# Patient Record
Sex: FEMALE | Race: WHITE | NOT HISPANIC OR LATINO
[De-identification: names, ages, dates, MRNs, and addresses within clinical notes are randomized per-mention and may not be internally consistent; named-entity substitution may affect disease eponyms.]

---

## 2017-05-21 ENCOUNTER — FORM ENCOUNTER (OUTPATIENT)
Age: 77
End: 2017-05-21

## 2017-05-22 ENCOUNTER — EMERGENCY (EMERGENCY)
Facility: HOSPITAL | Age: 77
LOS: 1 days | Discharge: HOME | End: 2017-05-22
Attending: STUDENT IN AN ORGANIZED HEALTH CARE EDUCATION/TRAINING PROGRAM
Payer: MEDICARE

## 2017-05-22 ENCOUNTER — APPOINTMENT (OUTPATIENT)
Dept: ULTRASOUND IMAGING | Facility: CLINIC | Age: 77
End: 2017-05-22

## 2017-05-22 ENCOUNTER — OUTPATIENT (OUTPATIENT)
Dept: OUTPATIENT SERVICES | Facility: HOSPITAL | Age: 77
LOS: 1 days | End: 2017-05-22
Payer: MEDICARE

## 2017-05-22 ENCOUNTER — APPOINTMENT (OUTPATIENT)
Dept: PULMONOLOGY | Facility: CLINIC | Age: 77
End: 2017-05-22

## 2017-05-22 VITALS
WEIGHT: 94 LBS | HEART RATE: 97 BPM | DIASTOLIC BLOOD PRESSURE: 60 MMHG | OXYGEN SATURATION: 96 % | BODY MASS INDEX: 17.75 KG/M2 | HEIGHT: 61 IN | SYSTOLIC BLOOD PRESSURE: 100 MMHG

## 2017-05-22 VITALS
HEART RATE: 67 BPM | TEMPERATURE: 98 F | WEIGHT: 93.92 LBS | SYSTOLIC BLOOD PRESSURE: 149 MMHG | RESPIRATION RATE: 100 BRPM | DIASTOLIC BLOOD PRESSURE: 80 MMHG

## 2017-05-22 DIAGNOSIS — Z79.82 LONG TERM (CURRENT) USE OF ASPIRIN: ICD-10-CM

## 2017-05-22 DIAGNOSIS — Z98.891 HISTORY OF UTERINE SCAR FROM PREVIOUS SURGERY: Chronic | ICD-10-CM

## 2017-05-22 DIAGNOSIS — H26.9 UNSPECIFIED CATARACT: Chronic | ICD-10-CM

## 2017-05-22 DIAGNOSIS — J99 MYONEURAL DISORDER, UNSPECIFIED: ICD-10-CM

## 2017-05-22 DIAGNOSIS — G12.21 AMYOTROPHIC LATERAL SCLEROSIS: ICD-10-CM

## 2017-05-22 DIAGNOSIS — I82.402 ACUTE EMBOLISM AND THROMBOSIS OF UNSPECIFIED DEEP VEINS OF LEFT LOWER EXTREMITY: ICD-10-CM

## 2017-05-22 DIAGNOSIS — D49.3 NEOPLASM OF UNSPECIFIED BEHAVIOR OF BREAST: Chronic | ICD-10-CM

## 2017-05-22 DIAGNOSIS — R94.2 ABNORMAL RESULTS OF PULMONARY FUNCTION STUDIES: ICD-10-CM

## 2017-05-22 DIAGNOSIS — G70.9 MYONEURAL DISORDER, UNSPECIFIED: ICD-10-CM

## 2017-05-22 DIAGNOSIS — I10 ESSENTIAL (PRIMARY) HYPERTENSION: ICD-10-CM

## 2017-05-22 DIAGNOSIS — Z79.899 OTHER LONG TERM (CURRENT) DRUG THERAPY: ICD-10-CM

## 2017-05-22 DIAGNOSIS — Z98.890 OTHER SPECIFIED POSTPROCEDURAL STATES: ICD-10-CM

## 2017-05-22 LAB
APTT BLD: 38 SEC — HIGH (ref 27.5–37.4)
BASOPHILS # BLD AUTO: 0 K/UL — SIGNIFICANT CHANGE UP (ref 0–0.2)
BASOPHILS NFR BLD AUTO: 0.3 % — SIGNIFICANT CHANGE UP (ref 0–2)
EOSINOPHIL # BLD AUTO: 0.1 K/UL — SIGNIFICANT CHANGE UP (ref 0–0.5)
EOSINOPHIL NFR BLD AUTO: 1.8 % — SIGNIFICANT CHANGE UP (ref 0–6)
HCT VFR BLD CALC: 38.5 % — SIGNIFICANT CHANGE UP (ref 37–47)
HGB BLD-MCNC: 12.5 G/DL — SIGNIFICANT CHANGE UP (ref 12–16)
INR BLD: 1.07 RATIO — SIGNIFICANT CHANGE UP (ref 0.88–1.16)
LYMPHOCYTES # BLD AUTO: 2 K/UL — SIGNIFICANT CHANGE UP (ref 1–4.8)
LYMPHOCYTES # BLD AUTO: 33.1 % — SIGNIFICANT CHANGE UP (ref 20–55)
MCHC RBC-ENTMCNC: 30.7 PG — SIGNIFICANT CHANGE UP (ref 27–31)
MCHC RBC-ENTMCNC: 32.5 G/DL — SIGNIFICANT CHANGE UP (ref 32–36)
MCV RBC AUTO: 94.6 FL — SIGNIFICANT CHANGE UP (ref 81–99)
MONOCYTES # BLD AUTO: 0.5 K/UL — SIGNIFICANT CHANGE UP (ref 0–0.8)
MONOCYTES NFR BLD AUTO: 7.6 % — SIGNIFICANT CHANGE UP (ref 3–10)
NEUTROPHILS # BLD AUTO: 3.4 K/UL — SIGNIFICANT CHANGE UP (ref 1.8–8)
NEUTROPHILS NFR BLD AUTO: 56.9 % — SIGNIFICANT CHANGE UP (ref 37–73)
PLATELET # BLD AUTO: 261 K/UL — SIGNIFICANT CHANGE UP (ref 150–400)
PROTHROM AB SERPL-ACNC: 11.8 SEC — SIGNIFICANT CHANGE UP (ref 9.8–12.7)
RBC # BLD: 4.07 M/UL — LOW (ref 4.4–5.2)
RBC # FLD: 14 % — SIGNIFICANT CHANGE UP (ref 11–15.6)
WBC # BLD: 6 K/UL — SIGNIFICANT CHANGE UP (ref 4.8–10.8)
WBC # FLD AUTO: 6 K/UL — SIGNIFICANT CHANGE UP (ref 4.8–10.8)

## 2017-05-22 PROCEDURE — 99283 EMERGENCY DEPT VISIT LOW MDM: CPT | Mod: 25

## 2017-05-22 PROCEDURE — 99284 EMERGENCY DEPT VISIT MOD MDM: CPT

## 2017-05-22 PROCEDURE — 93005 ELECTROCARDIOGRAM TRACING: CPT

## 2017-05-22 PROCEDURE — 85027 COMPLETE CBC AUTOMATED: CPT

## 2017-05-22 PROCEDURE — 93010 ELECTROCARDIOGRAM REPORT: CPT

## 2017-05-22 PROCEDURE — 80053 COMPREHEN METABOLIC PANEL: CPT

## 2017-05-22 PROCEDURE — 93970 EXTREMITY STUDY: CPT | Mod: 26

## 2017-05-22 PROCEDURE — 93970 EXTREMITY STUDY: CPT

## 2017-05-22 PROCEDURE — 85730 THROMBOPLASTIN TIME PARTIAL: CPT

## 2017-05-22 PROCEDURE — 85610 PROTHROMBIN TIME: CPT

## 2017-05-22 RX ORDER — HYDROCODONE BITARTRATE AND ACETAMINOPHEN 5; 325 MG/1; MG/1
5-325 TABLET ORAL
Qty: 20 | Refills: 0 | Status: DISCONTINUED | COMMUNITY
Start: 2017-04-06

## 2017-05-22 RX ORDER — ONDANSETRON 4 MG/1
4 TABLET, ORALLY DISINTEGRATING ORAL
Qty: 15 | Refills: 0 | Status: ACTIVE | COMMUNITY
Start: 2017-04-06

## 2017-05-22 RX ORDER — AMIODARONE HYDROCHLORIDE 200 MG/1
200 TABLET ORAL
Qty: 90 | Refills: 0 | Status: ACTIVE | COMMUNITY
Start: 2016-12-06

## 2017-05-22 RX ORDER — TIMOLOL 5.12 MG/ML
0.5 SOLUTION/ DROPS OPHTHALMIC
Qty: 20 | Refills: 0 | Status: DISCONTINUED | COMMUNITY
Start: 2016-11-22

## 2017-05-22 RX ORDER — POTASSIUM CHLORIDE 750 MG/1
10 CAPSULE, EXTENDED RELEASE ORAL
Qty: 90 | Refills: 0 | Status: ACTIVE | COMMUNITY
Start: 2017-04-28

## 2017-05-22 RX ORDER — BRINZOLAMIDE 10 MG/ML
1 SUSPENSION/ DROPS OPHTHALMIC
Qty: 30 | Refills: 0 | Status: DISCONTINUED | COMMUNITY
Start: 2017-04-19

## 2017-05-22 RX ORDER — METOPROLOL SUCCINATE 25 MG/1
25 TABLET, EXTENDED RELEASE ORAL
Qty: 90 | Refills: 0 | Status: ACTIVE | COMMUNITY
Start: 2016-12-06

## 2017-05-22 RX ORDER — CEPHALEXIN 500 MG/1
500 CAPSULE ORAL
Qty: 20 | Refills: 0 | Status: DISCONTINUED | COMMUNITY
Start: 2017-04-20

## 2017-05-22 RX ORDER — TAMSULOSIN HYDROCHLORIDE 0.4 MG/1
0.4 CAPSULE ORAL
Qty: 30 | Refills: 0 | Status: ACTIVE | COMMUNITY
Start: 2017-04-28

## 2017-05-22 RX ORDER — RIVAROXABAN 15 MG-20MG
1 KIT ORAL
Qty: 28 | Refills: 0 | OUTPATIENT
Start: 2017-05-22 | End: 2017-06-05

## 2017-05-22 NOTE — ED STATDOCS - PROGRESS NOTE DETAILS
77 y/o female with ALS, Afib (admitted in November/December), HTN, glaucoma and kidney stone surgery (last week at [facility]). Dr. Rodriguez is the pulmonologist, who found a clot in the left leg and was sent to ED; the study was done after swelling and redness to the leg. No CP, no SOB, no N/V/D. Not on blood thinners. NKDA.  Focused eval, protocol orders entered. Pt to be moved to main ED for complete evaluation by another provider.

## 2017-05-22 NOTE — ED PROVIDER NOTE - MEDICAL DECISION MAKING DETAILS
A 76 year old female pt presents to the ED c/o DVT.  Will treat for acute DVT with oral anti-coagulation assuming no renal disease. Pt is currently not a fall risk.

## 2017-05-22 NOTE — ED PROVIDER NOTE - PMH
ALS (amyotrophic lateral sclerosis)  no oxygen as per pt.  BiPAP (biphasic positive airway pressure) dependence  at night pt has personal bipap  Glaucoma    Kidney stones

## 2017-05-22 NOTE — ED ADULT TRIAGE NOTE - CHIEF COMPLAINT QUOTE
lt leg DVT, just had sono at radiology in [facility]. no chest pain, no dyspnea. no leg pain. pt is wheelchair bound, has ALS disease.

## 2017-05-23 LAB
ALBUMIN SERPL ELPH-MCNC: 4.1 G/DL — SIGNIFICANT CHANGE UP (ref 3.3–5.2)
ALP SERPL-CCNC: 89 U/L — SIGNIFICANT CHANGE UP (ref 40–120)
ALT FLD-CCNC: 21 U/L — SIGNIFICANT CHANGE UP
ANION GAP SERPL CALC-SCNC: 8 MMOL/L — SIGNIFICANT CHANGE UP (ref 5–17)
AST SERPL-CCNC: 24 U/L — SIGNIFICANT CHANGE UP
BILIRUB SERPL-MCNC: 0.2 MG/DL — LOW (ref 0.4–2)
BUN SERPL-MCNC: 20 MG/DL — SIGNIFICANT CHANGE UP (ref 8–20)
CALCIUM SERPL-MCNC: 10.5 MG/DL — HIGH (ref 8.6–10.2)
CHLORIDE SERPL-SCNC: 101 MMOL/L — SIGNIFICANT CHANGE UP (ref 98–107)
CO2 SERPL-SCNC: 29 MMOL/L — SIGNIFICANT CHANGE UP (ref 22–29)
CREAT SERPL-MCNC: 0.36 MG/DL — LOW (ref 0.5–1.3)
GLUCOSE SERPL-MCNC: 97 MG/DL — SIGNIFICANT CHANGE UP (ref 70–115)
POTASSIUM SERPL-MCNC: 4.4 MMOL/L — SIGNIFICANT CHANGE UP (ref 3.5–5.3)
POTASSIUM SERPL-SCNC: 4.4 MMOL/L — SIGNIFICANT CHANGE UP (ref 3.5–5.3)
PROT SERPL-MCNC: 6.7 G/DL — SIGNIFICANT CHANGE UP (ref 6.6–8.7)
SODIUM SERPL-SCNC: 138 MMOL/L — SIGNIFICANT CHANGE UP (ref 135–145)

## 2017-05-23 RX ORDER — RIVAROXABAN 15 MG-20MG
15 KIT ORAL DAILY
Qty: 0 | Refills: 0 | Status: DISCONTINUED | OUTPATIENT
Start: 2017-05-23 | End: 2017-05-26

## 2017-05-23 NOTE — ED ADULT NURSE NOTE - CHIEF COMPLAINT QUOTE
lt leg DVT, just had sono at radiology in [facility]. no chest pain, no dyspnea. no leg pain. pt is wheelchair bound, has ALS disease.

## 2017-06-06 ENCOUNTER — OTHER (OUTPATIENT)
Age: 77
End: 2017-06-06

## 2017-10-16 ENCOUNTER — APPOINTMENT (OUTPATIENT)
Dept: DERMATOLOGY | Facility: CLINIC | Age: 77
End: 2017-10-16
Payer: MEDICARE

## 2017-10-16 DIAGNOSIS — I82.409 ACUTE EMBOLISM AND THROMBOSIS OF UNSPECIFIED DEEP VEINS OF UNSPECIFIED LOWER EXTREMITY: ICD-10-CM

## 2017-10-16 PROCEDURE — 17110 DESTRUCTION B9 LES UP TO 14: CPT

## 2017-10-16 PROCEDURE — 99202 OFFICE O/P NEW SF 15 MIN: CPT | Mod: 25

## 2017-11-06 ENCOUNTER — FORM ENCOUNTER (OUTPATIENT)
Age: 77
End: 2017-11-06

## 2017-11-06 RX ORDER — RIVAROXABAN 2.5 MG/1
TABLET, FILM COATED ORAL
Refills: 0 | Status: ACTIVE | COMMUNITY

## 2017-11-07 ENCOUNTER — APPOINTMENT (OUTPATIENT)
Dept: ULTRASOUND IMAGING | Facility: CLINIC | Age: 77
End: 2017-11-07

## 2017-11-07 ENCOUNTER — OUTPATIENT (OUTPATIENT)
Dept: OUTPATIENT SERVICES | Facility: HOSPITAL | Age: 77
LOS: 1 days | End: 2017-11-07
Payer: MEDICARE

## 2017-11-07 DIAGNOSIS — Z98.891 HISTORY OF UTERINE SCAR FROM PREVIOUS SURGERY: Chronic | ICD-10-CM

## 2017-11-07 DIAGNOSIS — D49.3 NEOPLASM OF UNSPECIFIED BEHAVIOR OF BREAST: Chronic | ICD-10-CM

## 2017-11-07 DIAGNOSIS — H26.9 UNSPECIFIED CATARACT: Chronic | ICD-10-CM

## 2017-11-07 DIAGNOSIS — I82.409 ACUTE EMBOLISM AND THROMBOSIS OF UNSPECIFIED DEEP VEINS OF UNSPECIFIED LOWER EXTREMITY: ICD-10-CM

## 2017-11-07 PROCEDURE — 93970 EXTREMITY STUDY: CPT | Mod: 26

## 2017-11-07 PROCEDURE — 93970 EXTREMITY STUDY: CPT

## 2017-11-12 ENCOUNTER — TRANSCRIPTION ENCOUNTER (OUTPATIENT)
Age: 77
End: 2017-11-12

## 2017-11-16 ENCOUNTER — EMERGENCY (EMERGENCY)
Facility: HOSPITAL | Age: 77
LOS: 1 days | Discharge: HOME | End: 2017-11-16
Attending: EMERGENCY MEDICINE
Payer: MEDICARE

## 2017-11-16 VITALS
DIASTOLIC BLOOD PRESSURE: 102 MMHG | SYSTOLIC BLOOD PRESSURE: 154 MMHG | TEMPERATURE: 98 F | RESPIRATION RATE: 20 BRPM | OXYGEN SATURATION: 97 % | WEIGHT: 93.92 LBS | HEART RATE: 83 BPM

## 2017-11-16 DIAGNOSIS — Z98.891 HISTORY OF UTERINE SCAR FROM PREVIOUS SURGERY: Chronic | ICD-10-CM

## 2017-11-16 DIAGNOSIS — H26.9 UNSPECIFIED CATARACT: Chronic | ICD-10-CM

## 2017-11-16 DIAGNOSIS — D49.3 NEOPLASM OF UNSPECIFIED BEHAVIOR OF BREAST: Chronic | ICD-10-CM

## 2017-11-16 PROCEDURE — 36415 COLL VENOUS BLD VENIPUNCTURE: CPT

## 2017-11-16 PROCEDURE — 99284 EMERGENCY DEPT VISIT MOD MDM: CPT

## 2017-11-16 PROCEDURE — 86900 BLOOD TYPING SEROLOGIC ABO: CPT

## 2017-11-16 PROCEDURE — 99283 EMERGENCY DEPT VISIT LOW MDM: CPT

## 2017-11-16 PROCEDURE — 86901 BLOOD TYPING SEROLOGIC RH(D): CPT

## 2017-11-16 PROCEDURE — 86850 RBC ANTIBODY SCREEN: CPT

## 2017-11-16 PROCEDURE — 85730 THROMBOPLASTIN TIME PARTIAL: CPT

## 2017-11-16 PROCEDURE — 80053 COMPREHEN METABOLIC PANEL: CPT

## 2017-11-16 PROCEDURE — 85027 COMPLETE CBC AUTOMATED: CPT

## 2017-11-16 PROCEDURE — 85610 PROTHROMBIN TIME: CPT

## 2017-11-17 LAB
ALBUMIN SERPL ELPH-MCNC: 4 G/DL — SIGNIFICANT CHANGE UP (ref 3.3–5.2)
ALP SERPL-CCNC: 108 U/L — SIGNIFICANT CHANGE UP (ref 40–120)
ALT FLD-CCNC: 16 U/L — SIGNIFICANT CHANGE UP
ANION GAP SERPL CALC-SCNC: 9 MMOL/L — SIGNIFICANT CHANGE UP (ref 5–17)
APTT BLD: 40.2 SEC — HIGH (ref 27.5–37.4)
AST SERPL-CCNC: 24 U/L — SIGNIFICANT CHANGE UP
BASOPHILS # BLD AUTO: 0 K/UL — SIGNIFICANT CHANGE UP (ref 0–0.2)
BASOPHILS NFR BLD AUTO: 0.1 % — SIGNIFICANT CHANGE UP (ref 0–2)
BILIRUB SERPL-MCNC: 0.4 MG/DL — SIGNIFICANT CHANGE UP (ref 0.4–2)
BLD GP AB SCN SERPL QL: SIGNIFICANT CHANGE UP
BUN SERPL-MCNC: 18 MG/DL — SIGNIFICANT CHANGE UP (ref 8–20)
CALCIUM SERPL-MCNC: 10.7 MG/DL — HIGH (ref 8.6–10.2)
CHLORIDE SERPL-SCNC: 98 MMOL/L — SIGNIFICANT CHANGE UP (ref 98–107)
CO2 SERPL-SCNC: 32 MMOL/L — HIGH (ref 22–29)
CREAT SERPL-MCNC: 0.29 MG/DL — LOW (ref 0.5–1.3)
EOSINOPHIL # BLD AUTO: 0 K/UL — SIGNIFICANT CHANGE UP (ref 0–0.5)
EOSINOPHIL NFR BLD AUTO: 0.2 % — SIGNIFICANT CHANGE UP (ref 0–6)
GLUCOSE SERPL-MCNC: 111 MG/DL — SIGNIFICANT CHANGE UP (ref 70–115)
HCT VFR BLD CALC: 40.8 % — SIGNIFICANT CHANGE UP (ref 37–47)
HGB BLD-MCNC: 13.3 G/DL — SIGNIFICANT CHANGE UP (ref 12–16)
INR BLD: 1.12 RATIO — SIGNIFICANT CHANGE UP (ref 0.88–1.16)
LYMPHOCYTES # BLD AUTO: 1.4 K/UL — SIGNIFICANT CHANGE UP (ref 1–4.8)
LYMPHOCYTES # BLD AUTO: 16.4 % — LOW (ref 20–55)
MCHC RBC-ENTMCNC: 30.5 PG — SIGNIFICANT CHANGE UP (ref 27–31)
MCHC RBC-ENTMCNC: 32.6 G/DL — SIGNIFICANT CHANGE UP (ref 32–36)
MCV RBC AUTO: 93.6 FL — SIGNIFICANT CHANGE UP (ref 81–99)
MONOCYTES # BLD AUTO: 0.9 K/UL — HIGH (ref 0–0.8)
MONOCYTES NFR BLD AUTO: 10.4 % — HIGH (ref 3–10)
NEUTROPHILS # BLD AUTO: 6.4 K/UL — SIGNIFICANT CHANGE UP (ref 1.8–8)
NEUTROPHILS NFR BLD AUTO: 72.7 % — SIGNIFICANT CHANGE UP (ref 37–73)
PLATELET # BLD AUTO: 229 K/UL — SIGNIFICANT CHANGE UP (ref 150–400)
POTASSIUM SERPL-MCNC: 4.6 MMOL/L — SIGNIFICANT CHANGE UP (ref 3.5–5.3)
POTASSIUM SERPL-SCNC: 4.6 MMOL/L — SIGNIFICANT CHANGE UP (ref 3.5–5.3)
PROT SERPL-MCNC: 6.9 G/DL — SIGNIFICANT CHANGE UP (ref 6.6–8.7)
PROTHROM AB SERPL-ACNC: 12.4 SEC — SIGNIFICANT CHANGE UP (ref 9.8–12.7)
RBC # BLD: 4.36 M/UL — LOW (ref 4.4–5.2)
RBC # FLD: 13.4 % — SIGNIFICANT CHANGE UP (ref 11–15.6)
SODIUM SERPL-SCNC: 139 MMOL/L — SIGNIFICANT CHANGE UP (ref 135–145)
TYPE + AB SCN PNL BLD: SIGNIFICANT CHANGE UP
WBC # BLD: 8.7 K/UL — SIGNIFICANT CHANGE UP (ref 4.8–10.8)
WBC # FLD AUTO: 8.7 K/UL — SIGNIFICANT CHANGE UP (ref 4.8–10.8)

## 2017-11-17 NOTE — ED PROVIDER NOTE - OBJECTIVE STATEMENT
77 year old female with PMh ALS presents with bleeding from G tube site. As per , the pt had a G tube placed at [place] today. At approximately 3 pm the  noticed that the pt was bleeding from the site. Described as a persistent ooze. No pain, fever, chills, diarrhea.

## 2017-11-17 NOTE — ED PROVIDER NOTE - MEDICAL DECISION MAKING DETAILS
Abd is soft and nontender.   Hemostasis achieved- first pressure held for 15 min, then lido with epi injected which slowed but did not stop. Silver nitrate applied. Finally, figure 8 stitch applied, which stopped bleeding. Hgb stable Abd is soft and nontender.   Hemostasis achieved- first pressure held for 15 min, then lido with epi injected which slowed but did not stop. Silver nitrate applied. Finally, figure 8 stitch applied, which stopped bleeding. Hgb stable. Advised to follw upw ith pmd for suture removal in 7 days

## 2017-11-17 NOTE — ED ADULT NURSE NOTE - OBJECTIVE STATEMENT
pt awake and alert, nonverbal, hx of ALS presents to ED with  for bleeding. per , pt had G tube placed today at [facility] and noticed around 1500 that it was bleeding.  states bleeding went through sweater and he got nervous so he brought pt to ED. pt in no apparent distress, pain or discomfort. per MD, blood work drawn, will further evaluate wound site.

## 2017-11-18 ENCOUNTER — EMERGENCY (EMERGENCY)
Facility: HOSPITAL | Age: 77
LOS: 1 days | Discharge: HOME | End: 2017-11-18
Attending: EMERGENCY MEDICINE
Payer: MEDICARE

## 2017-11-18 VITALS
HEIGHT: 61 IN | TEMPERATURE: 98 F | RESPIRATION RATE: 20 BRPM | HEART RATE: 122 BPM | DIASTOLIC BLOOD PRESSURE: 78 MMHG | OXYGEN SATURATION: 97 % | SYSTOLIC BLOOD PRESSURE: 111 MMHG | WEIGHT: 93.92 LBS

## 2017-11-18 VITALS
OXYGEN SATURATION: 98 % | RESPIRATION RATE: 20 BRPM | TEMPERATURE: 99 F | SYSTOLIC BLOOD PRESSURE: 110 MMHG | HEART RATE: 98 BPM | DIASTOLIC BLOOD PRESSURE: 88 MMHG

## 2017-11-18 DIAGNOSIS — D49.3 NEOPLASM OF UNSPECIFIED BEHAVIOR OF BREAST: Chronic | ICD-10-CM

## 2017-11-18 DIAGNOSIS — H26.9 UNSPECIFIED CATARACT: Chronic | ICD-10-CM

## 2017-11-18 DIAGNOSIS — Z98.891 HISTORY OF UTERINE SCAR FROM PREVIOUS SURGERY: Chronic | ICD-10-CM

## 2017-11-18 PROCEDURE — 99283 EMERGENCY DEPT VISIT LOW MDM: CPT

## 2017-11-18 RX ADMIN — Medication 1 APPLICATION(S): at 20:45

## 2017-11-18 NOTE — ED PROVIDER NOTE - GASTROINTESTINAL, MLM
Abd with PEG tube site with clot around insertion site with minimal oozing of blood. No induration. No signs of infection. Otherwise abd soft, non-tender.

## 2017-11-18 NOTE — ED PROVIDER NOTE - OBJECTIVE STATEMENT
78 y/o F pt with PMHx of ALS presents to ED with  c/o bleeding around PEG tube site. As per , pt had PEG tube placed 3 days ago at [place] by means of endoscopy. Bleeding began after it was placed and pt was evaluated in [place] ED that night and PEG tube was "fixed". Today a home nurse came to help with the tube and after she left, the site began to bleed again, as per .  reports there is no f/u scheduled with the surgeon from [place]. Pt was on Xarelto up until 2 days before the procedure (5 days ago now). No fever, CP, SOB, cough, vomiting, diarrhea, or change in behavior. No further complaints at this time.  PMD: Dr. Lomeli

## 2017-11-18 NOTE — ED STATDOCS - PROGRESS NOTE DETAILS
This pt is a 78 y/o F with past hx of ALS presenting to the ED c/o bleeding from peg tube site. Pt was here at the ED 2 days ago for bleeding. Her  says nurse visited today and noticed bleeding. Pt's  says blood is currently pouring through gauze but says that stiches are in place. Pt has not been doing dressing changes because of the bleeding. Her family is anxious at bedside. Will send to [facility] for further evaluation. This pt is a 78 y/o F with past hx of ALS presenting to the ED c/o bleeding from peg tube site. Pt was here at the ED 2 days ago for bleeding. Her  says nurse visited today and noticed bleeding. Pt's  says blood is currently pouring through gauze but says that stiches are in place. Pt has not been doing dressing changes because of the bleeding.  Will send to main for further evaluation.

## 2017-11-18 NOTE — ED PROVIDER NOTE - PROGRESS NOTE DETAILS
Bleeding controlled with Avitene and dressing.  No recurrent bleeding.  Instructed to f/u with GI at [place] on Monday

## 2017-11-18 NOTE — ED ADULT NURSE NOTE - OBJECTIVE STATEMENT
pt care assumed at 1950, no apparent distress noted at this time. Pt received sitting in own wheelchair with  at bedside. pt is nonverbal. as per , pt had peg tube insertion on thursday at [facility]. Pt c/o bleeding from insertion site. As per , pt was here 2 days ago for the same complaint and sutures was place at this time. upon exam, pt had minimal bleeding on gauze pad. blood noticed at site. HR is regular, lung sounds are clear b/l, abd is soft and nontender with positive bowel sounds in all four quadrants, skin is warm, dry and appropriate for age and race. plan of care explained, will continue to monitor.

## 2017-11-20 ENCOUNTER — APPOINTMENT (OUTPATIENT)
Dept: PULMONOLOGY | Facility: CLINIC | Age: 77
End: 2017-11-20

## 2017-11-21 ENCOUNTER — INPATIENT (INPATIENT)
Facility: HOSPITAL | Age: 77
LOS: 3 days | Discharge: HOME | DRG: 394 | End: 2017-11-25
Attending: HOSPITALIST | Admitting: INTERNAL MEDICINE
Payer: MEDICARE

## 2017-11-21 VITALS
TEMPERATURE: 97 F | HEART RATE: 116 BPM | RESPIRATION RATE: 24 BRPM | WEIGHT: 93.92 LBS | SYSTOLIC BLOOD PRESSURE: 131 MMHG | OXYGEN SATURATION: 100 % | DIASTOLIC BLOOD PRESSURE: 77 MMHG

## 2017-11-21 DIAGNOSIS — Z98.891 HISTORY OF UTERINE SCAR FROM PREVIOUS SURGERY: Chronic | ICD-10-CM

## 2017-11-21 DIAGNOSIS — D49.3 NEOPLASM OF UNSPECIFIED BEHAVIOR OF BREAST: Chronic | ICD-10-CM

## 2017-11-21 DIAGNOSIS — D64.9 ANEMIA, UNSPECIFIED: ICD-10-CM

## 2017-11-21 DIAGNOSIS — H26.9 UNSPECIFIED CATARACT: Chronic | ICD-10-CM

## 2017-11-21 LAB
ALBUMIN SERPL ELPH-MCNC: 3.9 G/DL — SIGNIFICANT CHANGE UP (ref 3.3–5.2)
ALP SERPL-CCNC: 98 U/L — SIGNIFICANT CHANGE UP (ref 40–120)
ALT FLD-CCNC: 21 U/L — SIGNIFICANT CHANGE UP
ANION GAP SERPL CALC-SCNC: 11 MMOL/L — SIGNIFICANT CHANGE UP (ref 5–17)
APPEARANCE UR: CLEAR — SIGNIFICANT CHANGE UP
APTT BLD: 34.1 SEC — SIGNIFICANT CHANGE UP (ref 27.5–37.4)
AST SERPL-CCNC: 29 U/L — SIGNIFICANT CHANGE UP
BASOPHILS # BLD AUTO: 0 K/UL — SIGNIFICANT CHANGE UP (ref 0–0.2)
BASOPHILS NFR BLD AUTO: 0.1 % — SIGNIFICANT CHANGE UP (ref 0–2)
BILIRUB SERPL-MCNC: 0.3 MG/DL — LOW (ref 0.4–2)
BILIRUB UR-MCNC: NEGATIVE — SIGNIFICANT CHANGE UP
BLD GP AB SCN SERPL QL: SIGNIFICANT CHANGE UP
BUN SERPL-MCNC: 19 MG/DL — SIGNIFICANT CHANGE UP (ref 8–20)
CALCIUM SERPL-MCNC: 10.5 MG/DL — HIGH (ref 8.6–10.2)
CHLORIDE SERPL-SCNC: 99 MMOL/L — SIGNIFICANT CHANGE UP (ref 98–107)
CO2 SERPL-SCNC: 30 MMOL/L — HIGH (ref 22–29)
COLOR SPEC: YELLOW — SIGNIFICANT CHANGE UP
CREAT SERPL-MCNC: 0.22 MG/DL — LOW (ref 0.5–1.3)
DIFF PNL FLD: ABNORMAL
EOSINOPHIL # BLD AUTO: 0 K/UL — SIGNIFICANT CHANGE UP (ref 0–0.5)
EOSINOPHIL NFR BLD AUTO: 0.5 % — SIGNIFICANT CHANGE UP (ref 0–6)
EPI CELLS # UR: SIGNIFICANT CHANGE UP
GAS PNL BLDA: SIGNIFICANT CHANGE UP
GLUCOSE SERPL-MCNC: 94 MG/DL — SIGNIFICANT CHANGE UP (ref 70–115)
GLUCOSE UR QL: NEGATIVE MG/DL — SIGNIFICANT CHANGE UP
HCT VFR BLD CALC: 22.8 % — LOW (ref 37–47)
HCT VFR BLD CALC: 26 % — LOW (ref 37–47)
HGB BLD-MCNC: 7.3 G/DL — LOW (ref 12–16)
HGB BLD-MCNC: 8.3 G/DL — LOW (ref 12–16)
INR BLD: 1.05 RATIO — SIGNIFICANT CHANGE UP (ref 0.88–1.16)
KETONES UR-MCNC: NEGATIVE — SIGNIFICANT CHANGE UP
LACTATE BLDV-MCNC: 1.7 MMOL/L — SIGNIFICANT CHANGE UP (ref 0.5–2)
LEUKOCYTE ESTERASE UR-ACNC: NEGATIVE — SIGNIFICANT CHANGE UP
LYMPHOCYTES # BLD AUTO: 2.2 K/UL — SIGNIFICANT CHANGE UP (ref 1–4.8)
LYMPHOCYTES # BLD AUTO: 29.3 % — SIGNIFICANT CHANGE UP (ref 20–55)
MCHC RBC-ENTMCNC: 30.6 PG — SIGNIFICANT CHANGE UP (ref 27–31)
MCHC RBC-ENTMCNC: 30.7 PG — SIGNIFICANT CHANGE UP (ref 27–31)
MCHC RBC-ENTMCNC: 31.9 G/DL — LOW (ref 32–36)
MCHC RBC-ENTMCNC: 32 G/DL — SIGNIFICANT CHANGE UP (ref 32–36)
MCV RBC AUTO: 95.8 FL — SIGNIFICANT CHANGE UP (ref 81–99)
MCV RBC AUTO: 95.9 FL — SIGNIFICANT CHANGE UP (ref 81–99)
MONOCYTES # BLD AUTO: 0.8 K/UL — SIGNIFICANT CHANGE UP (ref 0–0.8)
MONOCYTES NFR BLD AUTO: 11 % — HIGH (ref 3–10)
NEUTROPHILS # BLD AUTO: 4.4 K/UL — SIGNIFICANT CHANGE UP (ref 1.8–8)
NEUTROPHILS NFR BLD AUTO: 58.8 % — SIGNIFICANT CHANGE UP (ref 37–73)
NITRITE UR-MCNC: NEGATIVE — SIGNIFICANT CHANGE UP
OB PNL STL: NEGATIVE — SIGNIFICANT CHANGE UP
PH UR: 7 — SIGNIFICANT CHANGE UP (ref 5–8)
PLATELET # BLD AUTO: 210 K/UL — SIGNIFICANT CHANGE UP (ref 150–400)
PLATELET # BLD AUTO: 224 K/UL — SIGNIFICANT CHANGE UP (ref 150–400)
POTASSIUM SERPL-MCNC: 4.7 MMOL/L — SIGNIFICANT CHANGE UP (ref 3.5–5.3)
POTASSIUM SERPL-SCNC: 4.7 MMOL/L — SIGNIFICANT CHANGE UP (ref 3.5–5.3)
PROT SERPL-MCNC: 6.8 G/DL — SIGNIFICANT CHANGE UP (ref 6.6–8.7)
PROT UR-MCNC: NEGATIVE MG/DL — SIGNIFICANT CHANGE UP
PROTHROM AB SERPL-ACNC: 11.6 SEC — SIGNIFICANT CHANGE UP (ref 9.8–12.7)
RBC # BLD: 2.38 M/UL — LOW (ref 4.4–5.2)
RBC # BLD: 2.71 M/UL — LOW (ref 4.4–5.2)
RBC # FLD: 14.2 % — SIGNIFICANT CHANGE UP (ref 11–15.6)
RBC # FLD: 14.3 % — SIGNIFICANT CHANGE UP (ref 11–15.6)
RBC CASTS # UR COMP ASSIST: ABNORMAL /HPF (ref 0–4)
SODIUM SERPL-SCNC: 140 MMOL/L — SIGNIFICANT CHANGE UP (ref 135–145)
SP GR SPEC: 1.01 — SIGNIFICANT CHANGE UP (ref 1.01–1.02)
TYPE + AB SCN PNL BLD: SIGNIFICANT CHANGE UP
UROBILINOGEN FLD QL: NEGATIVE MG/DL — SIGNIFICANT CHANGE UP
WBC # BLD: 7.4 K/UL — SIGNIFICANT CHANGE UP (ref 4.8–10.8)
WBC # BLD: 7.9 K/UL — SIGNIFICANT CHANGE UP (ref 4.8–10.8)
WBC # FLD AUTO: 7.4 K/UL — SIGNIFICANT CHANGE UP (ref 4.8–10.8)
WBC # FLD AUTO: 7.9 K/UL — SIGNIFICANT CHANGE UP (ref 4.8–10.8)
WBC UR QL: NEGATIVE — SIGNIFICANT CHANGE UP

## 2017-11-21 PROCEDURE — 71010: CPT | Mod: 26

## 2017-11-21 PROCEDURE — 99285 EMERGENCY DEPT VISIT HI MDM: CPT

## 2017-11-21 PROCEDURE — 93010 ELECTROCARDIOGRAM REPORT: CPT

## 2017-11-21 PROCEDURE — 74177 CT ABD & PELVIS W/CONTRAST: CPT | Mod: 26

## 2017-11-21 PROCEDURE — 99223 1ST HOSP IP/OBS HIGH 75: CPT

## 2017-11-21 RX ORDER — SODIUM CHLORIDE 9 MG/ML
3 INJECTION INTRAMUSCULAR; INTRAVENOUS; SUBCUTANEOUS ONCE
Qty: 0 | Refills: 0 | Status: COMPLETED | OUTPATIENT
Start: 2017-11-21 | End: 2017-11-21

## 2017-11-21 RX ORDER — VANCOMYCIN HCL 1 G
1000 VIAL (EA) INTRAVENOUS ONCE
Qty: 0 | Refills: 0 | Status: COMPLETED | OUTPATIENT
Start: 2017-11-21 | End: 2017-11-21

## 2017-11-21 RX ORDER — FUROSEMIDE 40 MG
20 TABLET ORAL ONCE
Qty: 0 | Refills: 0 | Status: COMPLETED | OUTPATIENT
Start: 2017-11-21 | End: 2017-11-21

## 2017-11-21 RX ORDER — DORZOLAMIDE HYDROCHLORIDE 20 MG/ML
1 SOLUTION/ DROPS OPHTHALMIC ONCE
Qty: 0 | Refills: 0 | Status: COMPLETED | OUTPATIENT
Start: 2017-11-21 | End: 2017-11-22

## 2017-11-21 RX ORDER — CEFEPIME 1 G/1
2000 INJECTION, POWDER, FOR SOLUTION INTRAMUSCULAR; INTRAVENOUS ONCE
Qty: 0 | Refills: 0 | Status: COMPLETED | OUTPATIENT
Start: 2017-11-21 | End: 2017-11-21

## 2017-11-21 RX ORDER — DORZOLAMIDE HYDROCHLORIDE 20 MG/ML
SOLUTION/ DROPS OPHTHALMIC
Qty: 0 | Refills: 0 | Status: DISCONTINUED | OUTPATIENT
Start: 2017-11-22 | End: 2017-11-25

## 2017-11-21 RX ORDER — DORZOLAMIDE HYDROCHLORIDE 20 MG/ML
1 SOLUTION/ DROPS OPHTHALMIC THREE TIMES A DAY
Qty: 0 | Refills: 0 | Status: DISCONTINUED | OUTPATIENT
Start: 2017-11-22 | End: 2017-11-25

## 2017-11-21 RX ORDER — BRIMONIDINE TARTRATE 2 MG/MG
1 SOLUTION/ DROPS OPHTHALMIC THREE TIMES A DAY
Qty: 0 | Refills: 0 | Status: DISCONTINUED | OUTPATIENT
Start: 2017-11-21 | End: 2017-11-22

## 2017-11-21 RX ORDER — TIMOLOL 0.5 %
1 DROPS OPHTHALMIC (EYE)
Qty: 0 | Refills: 0 | Status: DISCONTINUED | OUTPATIENT
Start: 2017-11-21 | End: 2017-11-25

## 2017-11-21 RX ORDER — SODIUM CHLORIDE 9 MG/ML
500 INJECTION INTRAMUSCULAR; INTRAVENOUS; SUBCUTANEOUS ONCE
Qty: 0 | Refills: 0 | Status: COMPLETED | OUTPATIENT
Start: 2017-11-21 | End: 2017-11-21

## 2017-11-21 RX ADMIN — CEFEPIME 100 MILLIGRAM(S): 1 INJECTION, POWDER, FOR SOLUTION INTRAMUSCULAR; INTRAVENOUS at 19:06

## 2017-11-21 RX ADMIN — SODIUM CHLORIDE 3 MILLILITER(S): 9 INJECTION INTRAMUSCULAR; INTRAVENOUS; SUBCUTANEOUS at 18:04

## 2017-11-21 RX ADMIN — Medication 250 MILLIGRAM(S): at 17:40

## 2017-11-21 RX ADMIN — SODIUM CHLORIDE 500 MILLILITER(S): 9 INJECTION INTRAMUSCULAR; INTRAVENOUS; SUBCUTANEOUS at 22:59

## 2017-11-21 NOTE — ED PROVIDER NOTE - ATTENDING CONTRIBUTION TO CARE
78yo female with pmh of ALS presents to ED with  for tachycardia and possible endocarditis by dr leonardo per call in report . Pt for the past couple of days tachy around 140s. Pt went to cardiologist had  echo done which was concerning for vegetations and pt with new murmur on exam. Pt reports palpitations denies cp and sob. Pt denies fevers and as per  pt at mental baseline. As per , pt had been bleeding from PEG site for several days was seen here for it and dc'd, pt was on xarelto prior to having peg placed not since. No fever, CP, SOB, cough, vomiting, diarrhea, or change in behavior.     plan to CT after lower h/h concern for possible blood loss in abdomen with prior peg issues/xarelto no fevers will plan on inpt echo r/o endocard given cards concern admin further in pt w/u

## 2017-11-21 NOTE — ED ADULT NURSE NOTE - CHIEF COMPLAINT QUOTE
From Dr. Diaz's office with possible endocarditis s/p echocardiogram.  Pt has ALS and PEG tube.  Full code status.  Code sepsis called.

## 2017-11-21 NOTE — ED ADULT TRIAGE NOTE - CHIEF COMPLAINT QUOTE
From Dr. Diaz's office with possible endocarditis.  Pt has ALS and PEG tube.  Full code status. From Dr. Diaz's office with possible endocarditis s/p echocardiogram.  Pt has ALS and PEG tube.  Full code status.  Code sepsis called.

## 2017-11-21 NOTE — H&P ADULT - NEUROLOGICAL DETAILS
pt. has baseline speech difficulty. pt. is alert, awake and understood when DNR/ DNI discussed  and wants to be DNR/DNI..

## 2017-11-21 NOTE — ED PROVIDER NOTE - OBJECTIVE STATEMENT
78yo female with pmh of ALS presents to ED with  for tachycardia and possible endocarditis. Pt for the past couple of days tachy around 140s. Pt went to cardiologist had  echo done which was concerning for vegetations and pt with new murmur on exam. Pt reports palpitations denies cp and sob. Pt denies fevers and as per  pt at mental baseline. As per , pt had been bleeding from PEG site for several days was seen here for it and dc'd, pt was on xarelto prior to having peg placed not since. No fever, CP, SOB, cough, vomiting, diarrhea, or change in behavior.   PMD: Dr. Lomeli

## 2017-11-21 NOTE — H&P ADULT - ASSESSMENT
Pt. is admitted for symptomatic anemia, likely source is acute blood loss related to her PEG tube and likely has left abd. wall hematoma. will give one unit prbc, follow cbc  and transfuse accordingly, serial abd. exam. surgery consult requested.    Tachycardia,  likely source is blood loss, pt. has no fever, wbc normal. vegetation on ECHO in dr. Hart office ? repaet echo done , report to follow, will keep on vanco and cefepime at this point, follow echo report, cultures and will get [place] cardio and ID consults.     ALS, symptomatic and supportive care.    Glaucoma. continue her eye drops.    DNR / DNI. pt's  is health care proxy, he signed the form as per pt's wishes, will bring health care proxy papers in am. Pt. is admitted for symptomatic anemia, likely source is acute blood loss related to her PEG tube and likely has left abd. wall hematoma. will give one unit prbc, follow cbc  and transfuse accordingly, serial abd. exam. surgery consult requested.    Tachycardia,  likely source is blood loss, pt. has no fever, wbc normal. vegetation on ECHO in dr. Hart office ? repaet echo done , report to follow, will keep on vanco and cefepime at this point, follow echo report, cultures and will get [place] cardio and ID consults.     ALS, symptomatic and supportive care.    Glaucoma. continue her eye drops.    h/o afib, xeralto on hold at this point, cardiology recommendations will be followed.    DNR / DNI. pt's  is health care proxy, he signed the form as per pt's wishes, will bring health care proxy papers in am. Pt. is admitted for symptomatic anemia, likely source is acute blood loss related to her PEG tube and likely has left abd. wall hematoma. will give one unit prbc, follow cbc  and transfuse accordingly, serial abd. exam. surgery consult requested.    Tachycardia,  likely source is blood loss, pt. has no fever, wbc normal. vegetation on ECHO in dr. Hart office ? repaet echo done , report to follow, will keep on vanco and cefepime at this point, follow echo report, cultures and will get [place] cardio and ID consults.     ALS, symptomatic and supportive care.    Glaucoma. unspecified type and unspecified laterality. will continue her eye drops.    h/o afib, xeralto on hold at this point, cardiology recommendations will be followed.    DNR / DNI. pt's  is health care proxy, he signed the form as per pt's wishes, will bring health care proxy papers in am.

## 2017-11-21 NOTE — ED ADULT NURSE REASSESSMENT NOTE - NS ED NURSE REASSESS COMMENT FT1
Rechecked HR and now 106. Pt family surround bed and the believe it to be stress related. Will continue to monitor patient and notify md of any changes
PT settled into 15R on b side, md umana at bedside admitting patient and speaking to them about plans of care.  Pt denies any pain or sob at this time. Report given to HR ANETTE otto
Pt axox3 with family at the bedside.Pt is sinus tach 130's, md larson working with md Arellano made aware. PT is afebrile with rectal temp of 97.9. Repeat CBC ordered and colleted, pending results. Pt resting comfortably and repositioned. Pt denies any chest pain or sob.  Pt sepsis flowsheet completed and discontinued, initial lactate resulted with 0.8. Will continue to monitor patient and notify md of any changes.

## 2017-11-21 NOTE — H&P ADULT - NSHPLABSRESULTS_GEN_ALL_CORE
cxr tilted film, no obvious infiltrate or effusion noted.  ekg is sinus tach 104, minimal voltage criteria for lvh, no st elevation noted.

## 2017-11-21 NOTE — ED PROVIDER NOTE - MEDICAL DECISION MAKING DETAILS
76yo female with pmh of ALS presents to ED with  for tachycardia and possible endocarditis - r/o endocarditis vs metabolic abn vs anemia

## 2017-11-21 NOTE — H&P ADULT - HISTORY OF PRESENT ILLNESS
78 y/o female with sig. PMH of ALS who had PEG tube placement about 4 days ago at [facility] has been seen at [facility] ER twice for bleeding around her PEG tube and was discharged after bleeding was controlled. For past few days her HR has been high. On the day of ER visit pt. was seen at cardiologist dr. Hart office and she was noted to have HR in 130s to 140s . As per history she had an echo in the office and was noted to have a murmur and concern for vegetations. Pt. was sent to ER for r/o endocarditis. pt. did not have any fever. no chills. mild left lower abd. side discomfort. no blood per rectum reported. no n/v. no cough. pt's speech is very limited and not clear due to her advanced ALS. pt's  is at bedside and helping with history. In the ER pt. was noted to have Hb of 7.3 and on 11/17/17 it was 13.3. 78 y/o female with sig. PMH of ALS who had PEG tube placement about 4 days ago at [facility] has been seen at [facility] ER twice for bleeding around her PEG tube and was discharged after bleeding was controlled. For past few days her HR has been high. On the day of ER visit pt. was seen at cardiologist dr. Hart office and she was noted to have HR in 130s to 140s . As per history she had an echo in the office and was noted to have a murmur and concern for vegetations. Pt. was sent to ER for r/o endocarditis. pt. did not have any fever. no chills. mild left lower abd. side discomfort. no blood per rectum reported. no n/v. no cough. pt's speech is very limited and not clear due to her advanced ALS. pt's  is at bedside and helping with history. In the ER pt. was noted to have Hb of 7.3 and on 11/17/17 it was 13.3.   Pt. has h/o afib and her Xeralto was stopped 2 days before her PEG procedure/ 76 y/o female with sig. PMH of ALS who had PEG tube placement about 4 days ago at [facility] has been seen at [facility] ER twice for bleeding around her PEG tube and was discharged after bleeding was controlled. For past few days her HR has been high. On the day of ER visit pt. was seen at cardiologist dr. Hart office and she was noted to have HR in 130s to 140s . As per history she had an echo in the office and was noted to have a murmur and concern for vegetations. Pt. was sent to ER for r/o endocarditis. pt. did not have any fever. no chills. mild left lower abd. side discomfort. no blood per rectum reported. no n/v. no cough. pt's speech is very limited and not clear due to her advanced ALS. pt's  is at bedside and helping with history. In the ER pt. was noted to have Hb of 7.3 and on 11/17/17 it was 13.3.   Pt. has h/o afib and her Xeralto was stopped 2 days before her PEG procedure. As per  she sometimes uses cpap at night.

## 2017-11-21 NOTE — ED ADULT NURSE NOTE - OBJECTIVE STATEMENT
Pt at baseline mentation as per her  sent to [facility] from [facility] law office for  high heart rate. PT  presenting with increased respirations and sinus tachycardic with a rate of 112. Pt has  pmh of ALS with G tube placement at [facility] for future feeding options.  states that after her  G tube was placed ( thursday) he had to bring  pt to [facility] for bleeding at insertion site that same day  and then again on saturday where Avient was placed on insertion site to help stop brleeding Pt at baseline mentation as per her  sent to [facility] from [facility] for  high heart rate. PT  presenting with increased respirations and sinus tachycardic with a rate of 112. Pt has  pmh of ALS with G tube placement at [facility] for future feeding options.  states that after her  G tube was placed ( thursday) he had to bring  pt to [facility] for bleeding at insertion site that same day  and then again on saturday where "Avient" was placed on insertion site to help stop bleeding. Pt is able to responded verbally but does have a baseline of garbled speech. Pt is contracted and favors right side and can not tolerate head of bed flat. Pt skin is intact, all bony prominences covered with allyven padded dressings for prevent.

## 2017-11-22 DIAGNOSIS — Z93.1 GASTROSTOMY STATUS: ICD-10-CM

## 2017-11-22 DIAGNOSIS — K94.23 GASTROSTOMY MALFUNCTION: ICD-10-CM

## 2017-11-22 DIAGNOSIS — I48.0 PAROXYSMAL ATRIAL FIBRILLATION: ICD-10-CM

## 2017-11-22 DIAGNOSIS — H40.9 UNSPECIFIED GLAUCOMA: ICD-10-CM

## 2017-11-22 DIAGNOSIS — S30.1XXA CONTUSION OF ABDOMINAL WALL, INITIAL ENCOUNTER: ICD-10-CM

## 2017-11-22 DIAGNOSIS — G12.21 AMYOTROPHIC LATERAL SCLEROSIS: ICD-10-CM

## 2017-11-22 DIAGNOSIS — Z29.9 ENCOUNTER FOR PROPHYLACTIC MEASURES, UNSPECIFIED: ICD-10-CM

## 2017-11-22 DIAGNOSIS — D50.0 IRON DEFICIENCY ANEMIA SECONDARY TO BLOOD LOSS (CHRONIC): ICD-10-CM

## 2017-11-22 LAB
ANION GAP SERPL CALC-SCNC: 11 MMOL/L — SIGNIFICANT CHANGE UP (ref 5–17)
BASOPHILS # BLD AUTO: 0 K/UL — SIGNIFICANT CHANGE UP (ref 0–0.2)
BASOPHILS NFR BLD AUTO: 0.3 % — SIGNIFICANT CHANGE UP (ref 0–2)
BUN SERPL-MCNC: 11 MG/DL — SIGNIFICANT CHANGE UP (ref 8–20)
CALCIUM SERPL-MCNC: 9.3 MG/DL — SIGNIFICANT CHANGE UP (ref 8.6–10.2)
CHLORIDE SERPL-SCNC: 105 MMOL/L — SIGNIFICANT CHANGE UP (ref 98–107)
CO2 SERPL-SCNC: 26 MMOL/L — SIGNIFICANT CHANGE UP (ref 22–29)
CREAT SERPL-MCNC: 0.22 MG/DL — LOW (ref 0.5–1.3)
CULTURE RESULTS: SIGNIFICANT CHANGE UP
EOSINOPHIL # BLD AUTO: 0.1 K/UL — SIGNIFICANT CHANGE UP (ref 0–0.5)
EOSINOPHIL NFR BLD AUTO: 1 % — SIGNIFICANT CHANGE UP (ref 0–6)
GLUCOSE BLDC GLUCOMTR-MCNC: 141 MG/DL — HIGH (ref 70–99)
GLUCOSE SERPL-MCNC: 111 MG/DL — SIGNIFICANT CHANGE UP (ref 70–115)
HCT VFR BLD CALC: 25.6 % — LOW (ref 37–47)
HCT VFR BLD CALC: 25.7 % — LOW (ref 37–47)
HCT VFR BLD CALC: 25.8 % — LOW (ref 37–47)
HCT VFR BLD CALC: 26.9 % — LOW (ref 37–47)
HGB BLD-MCNC: 8.2 G/DL — LOW (ref 12–16)
HGB BLD-MCNC: 8.3 G/DL — LOW (ref 12–16)
HGB BLD-MCNC: 8.4 G/DL — LOW (ref 12–16)
HGB BLD-MCNC: 8.6 G/DL — LOW (ref 12–16)
LYMPHOCYTES # BLD AUTO: 1.6 K/UL — SIGNIFICANT CHANGE UP (ref 1–4.8)
LYMPHOCYTES # BLD AUTO: 26.1 % — SIGNIFICANT CHANGE UP (ref 20–55)
MCHC RBC-ENTMCNC: 29.1 PG — SIGNIFICANT CHANGE UP (ref 27–31)
MCHC RBC-ENTMCNC: 32 G/DL — SIGNIFICANT CHANGE UP (ref 32–36)
MCV RBC AUTO: 90.8 FL — SIGNIFICANT CHANGE UP (ref 81–99)
MONOCYTES # BLD AUTO: 0.6 K/UL — SIGNIFICANT CHANGE UP (ref 0–0.8)
MONOCYTES NFR BLD AUTO: 9.8 % — SIGNIFICANT CHANGE UP (ref 3–10)
NEUTROPHILS # BLD AUTO: 3.9 K/UL — SIGNIFICANT CHANGE UP (ref 1.8–8)
NEUTROPHILS NFR BLD AUTO: 62.5 % — SIGNIFICANT CHANGE UP (ref 37–73)
PLATELET # BLD AUTO: 178 K/UL — SIGNIFICANT CHANGE UP (ref 150–400)
POTASSIUM SERPL-MCNC: 3.8 MMOL/L — SIGNIFICANT CHANGE UP (ref 3.5–5.3)
POTASSIUM SERPL-SCNC: 3.8 MMOL/L — SIGNIFICANT CHANGE UP (ref 3.5–5.3)
RBC # BLD: 2.82 M/UL — LOW (ref 4.4–5.2)
RBC # FLD: 18.1 % — HIGH (ref 11–15.6)
SODIUM SERPL-SCNC: 142 MMOL/L — SIGNIFICANT CHANGE UP (ref 135–145)
SPECIMEN SOURCE: SIGNIFICANT CHANGE UP
WBC # BLD: 6.2 K/UL — SIGNIFICANT CHANGE UP (ref 4.8–10.8)
WBC # FLD AUTO: 6.2 K/UL — SIGNIFICANT CHANGE UP (ref 4.8–10.8)

## 2017-11-22 PROCEDURE — 99222 1ST HOSP IP/OBS MODERATE 55: CPT

## 2017-11-22 PROCEDURE — 99221 1ST HOSP IP/OBS SF/LOW 40: CPT

## 2017-11-22 PROCEDURE — 99233 SBSQ HOSP IP/OBS HIGH 50: CPT

## 2017-11-22 RX ORDER — PANTOPRAZOLE SODIUM 20 MG/1
40 TABLET, DELAYED RELEASE ORAL DAILY
Qty: 0 | Refills: 0 | Status: DISCONTINUED | OUTPATIENT
Start: 2017-11-22 | End: 2017-11-25

## 2017-11-22 RX ORDER — BRIMONIDINE TARTRATE 2 MG/MG
1 SOLUTION/ DROPS OPHTHALMIC THREE TIMES A DAY
Qty: 0 | Refills: 0 | Status: DISCONTINUED | OUTPATIENT
Start: 2017-11-22 | End: 2017-11-25

## 2017-11-22 RX ORDER — CEFEPIME 1 G/1
2000 INJECTION, POWDER, FOR SOLUTION INTRAMUSCULAR; INTRAVENOUS EVERY 8 HOURS
Qty: 0 | Refills: 0 | Status: DISCONTINUED | OUTPATIENT
Start: 2017-11-22 | End: 2017-11-22

## 2017-11-22 RX ORDER — VANCOMYCIN HCL 1 G
750 VIAL (EA) INTRAVENOUS EVERY 12 HOURS
Qty: 0 | Refills: 0 | Status: DISCONTINUED | OUTPATIENT
Start: 2017-11-22 | End: 2017-11-22

## 2017-11-22 RX ORDER — DEXTROSE 50 % IN WATER 50 %
12.5 SYRINGE (ML) INTRAVENOUS ONCE
Qty: 0 | Refills: 0 | Status: DISCONTINUED | OUTPATIENT
Start: 2017-11-22 | End: 2017-11-25

## 2017-11-22 RX ORDER — DEXTROSE 50 % IN WATER 50 %
1 SYRINGE (ML) INTRAVENOUS ONCE
Qty: 0 | Refills: 0 | Status: DISCONTINUED | OUTPATIENT
Start: 2017-11-22 | End: 2017-11-25

## 2017-11-22 RX ORDER — SODIUM CHLORIDE 9 MG/ML
1000 INJECTION INTRAMUSCULAR; INTRAVENOUS; SUBCUTANEOUS
Qty: 0 | Refills: 0 | Status: COMPLETED | OUTPATIENT
Start: 2017-11-22 | End: 2017-11-22

## 2017-11-22 RX ORDER — GLUCAGON INJECTION, SOLUTION 0.5 MG/.1ML
1 INJECTION, SOLUTION SUBCUTANEOUS ONCE
Qty: 0 | Refills: 0 | Status: DISCONTINUED | OUTPATIENT
Start: 2017-11-22 | End: 2017-11-25

## 2017-11-22 RX ORDER — SODIUM CHLORIDE 9 MG/ML
1000 INJECTION, SOLUTION INTRAVENOUS
Qty: 0 | Refills: 0 | Status: DISCONTINUED | OUTPATIENT
Start: 2017-11-22 | End: 2017-11-25

## 2017-11-22 RX ORDER — LACTOBACILLUS ACIDOPHILUS 100MM CELL
1 CAPSULE ORAL DAILY
Qty: 0 | Refills: 0 | Status: DISCONTINUED | OUTPATIENT
Start: 2017-11-22 | End: 2017-11-25

## 2017-11-22 RX ORDER — DEXTROSE 50 % IN WATER 50 %
25 SYRINGE (ML) INTRAVENOUS ONCE
Qty: 0 | Refills: 0 | Status: DISCONTINUED | OUTPATIENT
Start: 2017-11-22 | End: 2017-11-25

## 2017-11-22 RX ADMIN — PANTOPRAZOLE SODIUM 40 MILLIGRAM(S): 20 TABLET, DELAYED RELEASE ORAL at 12:09

## 2017-11-22 RX ADMIN — DORZOLAMIDE HYDROCHLORIDE 1 DROP(S): 20 SOLUTION/ DROPS OPHTHALMIC at 00:32

## 2017-11-22 RX ADMIN — DORZOLAMIDE HYDROCHLORIDE 1 DROP(S): 20 SOLUTION/ DROPS OPHTHALMIC at 05:16

## 2017-11-22 RX ADMIN — Medication 1 TABLET(S): at 12:09

## 2017-11-22 RX ADMIN — DORZOLAMIDE HYDROCHLORIDE 1 DROP(S): 20 SOLUTION/ DROPS OPHTHALMIC at 21:34

## 2017-11-22 RX ADMIN — Medication 1 DROP(S): at 18:22

## 2017-11-22 RX ADMIN — SODIUM CHLORIDE 60 MILLILITER(S): 9 INJECTION INTRAMUSCULAR; INTRAVENOUS; SUBCUTANEOUS at 08:23

## 2017-11-22 RX ADMIN — BRIMONIDINE TARTRATE 1 DROP(S): 2 SOLUTION/ DROPS OPHTHALMIC at 05:17

## 2017-11-22 RX ADMIN — CEFEPIME 100 MILLIGRAM(S): 1 INJECTION, POWDER, FOR SOLUTION INTRAMUSCULAR; INTRAVENOUS at 12:05

## 2017-11-22 RX ADMIN — CEFEPIME 100 MILLIGRAM(S): 1 INJECTION, POWDER, FOR SOLUTION INTRAMUSCULAR; INTRAVENOUS at 05:16

## 2017-11-22 RX ADMIN — BRIMONIDINE TARTRATE 1 DROP(S): 2 SOLUTION/ DROPS OPHTHALMIC at 21:34

## 2017-11-22 RX ADMIN — Medication 150 MILLIGRAM(S): at 05:56

## 2017-11-22 RX ADMIN — Medication 1 DROP(S): at 05:16

## 2017-11-22 NOTE — CONSULT NOTE ADULT - ASSESSMENT
Patient with recent IR guided PEG tube placement with abdominal wall hematoma. No active bleeding noted. PEG tube site looks good.  No need to remove or replace PEG tube  No GI intervention planned  GI will sign off

## 2017-11-22 NOTE — CONSULT NOTE ADULT - SUBJECTIVE AND OBJECTIVE BOX
HPI:  76 y/o female with sig. PMH of ALS who had PEG tube placement about 4 days ago at [facility] by Interventional radiology has been seen at [facility] ER twice for bleeding around her PEG tube and was discharged after bleeding was controlled. For past few days her HR has been high. On the day of ER visit pt. was seen at cardiologist dr. Hart office and she was noted to have HR in 130s to 140s . As per history she had an echo in the office and was noted to have a murmur and concern for vegetations. Pt. was sent to ER for r/o endocarditis. Pt. did not have any fever. no chills. mild left lower abd. side discomfort. no blood per rectum reported. no n/v. no cough. pt's speech is very limited and not clear due to her advanced ALS. pt's  is at bedside and helping with history. In the ER pt. was noted to have Hb of 7.3 and on 17 it was 13.3.   Pt. has h/o afib and her Xeralto was stopped 2 days before her PEG procedure. As per  she sometimes uses cpap at night.     PAST MEDICAL & SURGICAL HISTORY:  BiPAP (biphasic positive airway pressure) dependence: at night pt has personal bipap  Kidney stones  Glaucoma  ALS (amyotrophic lateral sclerosis): no oxygen as per pt.  Breast tumor: removed L side  Bilateral cataracts: Eye lids surgery  [place] surgery  H/O:       ROS:  No Heartburn, regurgitation, dysphagia, odynophagia.  No dyspepsia  No abdominal pain.    No Nausea, vomiting.  No Bleeding.  No hematemesis.   No diarrhea.    No hematochezia  No weight loss, anorexia.  No edema.      MEDICATIONS  (STANDING):  brimonidine 0.2% Ophthalmic Solution 1 Drop(s) Both EYES three times a day  dextrose 5%. 1000 milliLiter(s) (50 mL/Hr) IV Continuous <Continuous>  dextrose 50% Injectable 12.5 Gram(s) IV Push once  dextrose 50% Injectable 25 Gram(s) IV Push once  dextrose 50% Injectable 25 Gram(s) IV Push once  dorzolamide 2% Ophthalmic Solution 1 Drop(s) Both EYES three times a day  dorzolamide 2% Ophthalmic Solution      lactobacillus acidophilus 1 Tablet(s) Oral daily  pantoprazole  Injectable 40 milliGRAM(s) IV Push daily  timolol 0.5% Solution 1 Drop(s) Both EYES two times a day    MEDICATIONS  (PRN):  dextrose Gel 1 Dose(s) Oral once PRN Blood Glucose LESS THAN 70 milliGRAM(s)/deciLiter  glucagon  Injectable 1 milliGRAM(s) IntraMuscular once PRN Glucose <70 milliGRAM(s)/deciLiter      Allergies    No Known Allergies    Intolerances        SOCIAL HISTORY:Negative x3    FAMILY HISTORY:  Family history of coronary artery disease (Father)      Vital Signs Last 24 Hrs  T(C): 36.6 (2017 15:52), Max: 36.7 (2017 10:19)  T(F): 97.9 (2017 15:52), Max: 98.1 (2017 10:19)  HR: 88 (2017 15:52) (83 - 133)  BP: 106/62 (2017 15:52) (97/54 - 112/79)  BP(mean): --  RR: 18 (2017 15:52) (14 - 20)  SpO2: 100% (2017 15:52) (93% - 100%)    PHYSICAL EXAM:    GENERAL: NAD, well-groomed, Communicates by talking  HEAD:  Atraumatic, Normocephalic  EYES: EOMI, PERRLA, conjunctiva and sclera clear  ENMT: No tonsillar erythema, exudates, or enlargement; Moist mucous membranes, Good dentition, No lesions  NECK: Supple, No JVD, Normal thyroid  CHEST/LUNG: Clear to percussion bilaterally; No rales, rhonchi, wheezing, or rubs  HEART: Regular rate and rhythm; No murmurs, rubs, or gallops  ABDOMEN: Soft, Nontender, Nondistended; Bowel sounds present. PEG tube site inspected. Mild tenderness around the stomal site  EXTREMITIES:  2+ Peripheral Pulses, No clubbing, cyanosis, or edema  LYMPH: No lymphadenopathy noted  SKIN: No rashes or lesions      LABS:                        8.3    x     )-----------( x        ( 2017 15:13 )             25.8     11    142  |  105  |  11.0  ----------------------------<  111  3.8   |  26.0  |  0.22<L>    Ca    9.3      2017 06:31    TPro  6.8  /  Alb  3.9  /  TBili  0.3<L>  /  DBili  x   /  AST  29  /  ALT  21  /  AlkPhos  98  11-21    PT/INR - ( 2017 16:49 )   PT: 11.6 sec;   INR: 1.05 ratio         PTT - ( 2017 16:49 )  PTT:34.1 sec   Urinalysis Basic - ( 2017 19:35 )    Color: Yellow / Appearance: Clear / S.010 / pH: x  Gluc: x / Ketone: Negative  / Bili: Negative / Urobili: Negative mg/dL   Blood: x / Protein: Negative mg/dL / Nitrite: Negative   Leuk Esterase: Negative / RBC: 3-5 /HPF / WBC Negative   Sq Epi: x / Non Sq Epi: Occasional / Bacteria: x        LIVER FUNCTIONS - ( 2017 16:49 )  Alb: 3.9 g/dL / Pro: 6.8 g/dL / ALK PHOS: 98 U/L / ALT: 21 U/L / AST: 29 U/L / GGT: x               RADIOLOGY & ADDITIONAL STUDIES:  < from: CT Abdomen and Pelvis w/ IV Cont (17 @ 20:14) >  TECHNIQUE: Contrast-enhanced CT of the abdomen and pelvis. Multiple   contiguous axial images images are submitted for interpretation with the   administration of 95 cc of Omnipaque 300 intravenous contrast. Oral   contrast was administered.    FINDINGS:  Mild bibasal atelectasis. Heart is enlarged. Small right-sided effusion.   The liver, gallbladder, spleen, adrenals, and adrenal glands are   unremarkable in appearance. Bilateral renal tiny cortical hypodensities,   likely representing cysts. Left kidney midpole 4 mm nonobstructive   nephrolithiasis.    PEG tube balloon is noted within the stomach. There is moderate fluid   adjacent to the PEG tube in the adjacent abdominal wall image 82, series   3, measures measures about 5.5 cm in its greatest axial dimension.     The urinary bladder is markedly distended. The uterus demonstrates some   dystrophic calcifications. Large amount of fecal material throughout   large bowel. No gross evidence of ileus or obstruction. Moderate to   severe abdominal aortic atherosclerotic calcifications. No acute osseous   abnormality.      IMPRESSION:    PEG tube balloon within the stomach. Moderate size left abdominal wall   fluid collection. No evidence of CT dye extravasation in the fluid   collection.    < end of copied text >

## 2017-11-22 NOTE — PROGRESS NOTE ADULT - SUBJECTIVE AND OBJECTIVE BOX
[place] [facility], THE [facility]ART [facility]ENTER                                   [address] [place] [address]                                                      PHONE: [phone]                                                         FAX: [phone]  [email or web address]  ---------------------------------------------------------------------------------------------------------------------------------    FU for  Pt seen and examined.     Overnight events/patient complaints:    No Known Allergies    MEDICATIONS  (STANDING):  brimonidine 0.2% Ophthalmic Solution 1 Drop(s) Both EYES three times a day  dextrose 5%. 1000 milliLiter(s) (50 mL/Hr) IV Continuous <Continuous>  dextrose 50% Injectable 12.5 Gram(s) IV Push once  dextrose 50% Injectable 25 Gram(s) IV Push once  dextrose 50% Injectable 25 Gram(s) IV Push once  dorzolamide 2% Ophthalmic Solution 1 Drop(s) Both EYES three times a day  dorzolamide 2% Ophthalmic Solution      lactobacillus acidophilus 1 Tablet(s) Oral daily  pantoprazole  Injectable 40 milliGRAM(s) IV Push daily  timolol 0.5% Solution 1 Drop(s) Both EYES two times a day    MEDICATIONS  (PRN):  dextrose Gel 1 Dose(s) Oral once PRN Blood Glucose LESS THAN 70 milliGRAM(s)/deciLiter  glucagon  Injectable 1 milliGRAM(s) IntraMuscular once PRN Glucose <70 milliGRAM(s)/deciLiter      Vital Signs Last 24 Hrs  T(C): 36.6 (2017 15:52), Max: 36.7 (2017 10:19)  T(F): 97.9 (2017 15:52), Max: 98.1 (2017 10:19)  HR: 88 (2017 15:52) (83 - 128)  BP: 106/62 (2017 15:52) (97/54 - 111/62)  BP(mean): --  RR: 18 (2017 15:52) (14 - 19)  SpO2: 100% (2017 15:52) (93% - 100%)  ICU Vital Signs Last 24 Hrs  I&O's Summary      PHYSICAL EXAM:  HEENT: no JVD  CARDIOVASCULAR: Normal S1 and S2, No murmur, rub, gallop or lift.   LUNGS: No rales, rhonchi or wheeze. Normal breath sounds bilaterally.  ABDOMEN: Soft, nontender without mass or organomegaly. bowel sounds normoactive.  EXTREMITIES: no edema          LABS:                        8.6    x     )-----------( x        ( 2017 18:04 )             26.9     11-    142  |  105  |  11.0  ----------------------------<  111  3.8   |  26.0  |  0.22<L>    Ca    9.3      2017 06:31    TPro  6.8  /  Alb  3.9  /  TBili  0.3<L>  /  DBili  x   /  AST  29  /  ALT  21  /  AlkPhos  98  11-    NIKKI RODRIGUEZ      PT/INR - ( 2017 16:49 )   PT: 11.6 sec;   INR: 1.05 ratio         PTT - ( 2017 16:49 )  PTT:34.1 sec  Urinalysis Basic - ( 2017 19:35 )    Color: Yellow / Appearance: Clear / S.010 / pH: x  Gluc: x / Ketone: Negative  / Bili: Negative / Urobili: Negative mg/dL   Blood: x / Protein: Negative mg/dL / Nitrite: Negative   Leuk Esterase: Negative / RBC: 3-5 /HPF / WBC Negative   Sq Epi: x / Non Sq Epi: Occasional / Bacteria: x        RADIOLOGY & ADDITIONAL STUDIES:    LABS:                        8.6    x     )-----------( x        ( 2017 18:04 )             26.9     11-    142  |  105  |  11.0  ----------------------------<  111  3.8   |  26.0  |  0.22<L>    Ca    9.3      2017 06:31    TPro  6.8  /  Alb  3.9  /  TBili  0.3<L>  /  DBili  x   /  AST  29  /  ALT  21  /  AlkPhos  98  11-21    77y      PT/INR - ( 2017 16:49 )   PT: 11.6 sec;   INR: 1.05 ratio         PTT - ( 2017 16:49 )  PTT:34.1 sec  Urinalysis Basic - ( 2017 19:35 )    Color: Yellow / Appearance: Clear / S.010 / pH: x  Gluc: x / Ketone: Negative  / Bili: Negative / Urobili: Negative mg/dL   Blood: x / Protein: Negative mg/dL / Nitrite: Negative   Leuk Esterase: Negative / RBC: 3-5 /HPF / WBC Negative   Sq Epi: x / Non Sq Epi: Occasional / Bacteria: x        Assessment and Plan:  77 year old woman with ALS, paroxsymal atrial fibrillation on xarelto, Takotsubo cardiomyopathy (recovered EF), prior DVT, with recent PEG placement complicated by bleed and subsequent abdominal hematoma who presented to her outpatient cardiologist with tachycardia, lethargy and hypotension found to have hyperdynamic LVEF with THELMA, RV dilatation, suspected vegetation of AV, MV and TV as well as severe MR and TR concerning for endocarditis

## 2017-11-22 NOTE — PROGRESS NOTE ADULT - PROBLEM SELECTOR PLAN 5
-Continue current management  -Patient able to take PO  -Aspiration precautions  -Palliative care consult Progressive   -Continue current management with supportive care   -Patient able to take PO  -Aspiration precautions  -Palliative care consult

## 2017-11-22 NOTE — PROGRESS NOTE ADULT - PROBLEM SELECTOR PLAN 2
-2/2 PEG site bleeding  -PEG placed last week  -H&H dropped from 13.3 to 7.3  -s/p 1 unit PRBCs and some fluid  -H&H 8.2 and stable  -Recheck H&H around 18:00  -trend H&H  -given patient's severe valvular disease would be cautious with fluid resuscitation and transfusions.   -If fluid is needed would give in small boluses -2/2 PEG site bleeding/ abdominal wall hematoma   -PEG placed last week  -H&H dropped from 13.3 to 7.3  -s/p 1 unit PRBCs and some fluid  -H&H 8.2 and stable  -Recheck H&H around 18:00  -trend H&H  -given patient's severe valvular disease would be cautious with fluid resuscitation and transfusions.   -If fluid is needed would give in small boluses  -Moderate size left abdominal wall fluid collection. No evidence of CT dye extravasation in the fluid   collection.  -surgery f/u noted and appreciated and no intervention at this time

## 2017-11-22 NOTE — CONSULT NOTE ADULT - SUBJECTIVE AND OBJECTIVE BOX
[place] [facility], [facility]                [address] [place] [address]                                     PHONE: [phone]                                       FAX: [phone]  [email or web address]      Reason for Consult: suspected endocarditis     HPI:  Patient is a 77 year old woman with amyotrophic lateral sclerosis, paroxsymal atrial fibrillation on xarelto, Takotsubo cardiomyopathy (recovered EF), prior DVT, with recent PEG placement complicated by bleed and subsequent abdominal hematoma who presented to her outpatient cardiologist with tachycardia, lethargy and hypotension. In office TTE with hyperdynamic LVEF with THELMA, RV dilatation, suspected vegetation of AV, MV and TV as well as severe MR and TR, which are new. Patient was therefore referred to admission and further infectious work-up after extensive discussion with her . He denies that she has had fever and chills.   Unable to obtain history directly from the patient.     PAST MEDICAL & SURGICAL HISTORY:  BiPAP (biphasic positive airway pressure) dependence: at night pt has personal bipap  Kidney stones  Glaucoma  ALS (amyotrophic lateral sclerosis): no oxygen as per pt.  Breast tumor: removed L side  Bilateral cataracts: Eye lids surgery  Hunter surgery  H/O:     PREVIOUS DIAGNOSTIC TESTING:      ECHO  FINDINGS: TTE 17  EF>75% with LVOT gradient and THELMA  Severe RV dilatation  Severe TR, MR  Possible vegetation of AV, MV and TV new as compared to prior TTE    MEDICATIONS  (STANDING):  brimonidine 0.2% Ophthalmic Solution 1 Drop(s) Both EYES three times a day  cefepime  IVPB 2000 milliGRAM(s) IV Intermittent every 8 hours  dorzolamide 2% Ophthalmic Solution 1 Drop(s) Both EYES three times a day  dorzolamide 2% Ophthalmic Solution      pantoprazole  Injectable 40 milliGRAM(s) IV Push daily  timolol 0.5% Solution 1 Drop(s) Both EYES two times a day  vancomycin  IVPB 750 milliGRAM(s) IV Intermittent every 12 hours    FAMILY HISTORY:  Family history of coronary artery disease (Father)    SOCIAL HISTORY:  CIGARETTES: denies   ALCOHOL: denies     ROS: limited secondary to patient's ALS, history predominantly obtained from the  who was at bedside     Vital Signs Last 24 Hrs  T(C): 36.4 (2017 03:32), Max: 36.6 (2017 00:06)  T(F): 97.6 (2017 03:32), Max: 97.8 (2017 00:06)  HR: 97 (2017 03:32) (97 - 133)  BP: 101/67 (2017 03:32) (98/56 - 131/77)  BP(mean): --  RR: 17 (2017 03:32) (14 - 24)  SpO2: 93% (2017 03:32) (93% - 100%)    PHYSICAL EXAM:  General: chronically ill appearing; thin and contracted   HEENT: Head; normocephalic, atraumatic.  Eyes;   Pupils reactive, cornea wnl.  Neck; Supple, no nodes adenopathy, no NVD or carotid bruit or thyromegaly.  CARDIOVASCULAR; 2/6 systolic murmur, no rub, gallop or lift. Normal S1 and S2.  LUNGS; Coarse breath sounds   ABDOMEN ; Soft, nontender without mass or organomegaly. bowel sounds normoactive.  EXTREMITIES; No clubbing, cyanosis or edema. Distal pulses wnl. ROM normal.  SKIN; warm and dry with normal turgor.  NEURO; patient is contracted; hx ALS         INTERPRETATION OF TELEMETRY:    ECG: normal sinus rhythm with non-specific ST-T changes     I&O's Detail      LABS:                        8.4    x     )-----------( x        ( 2017 06:31 )             25.7     11-22    142  |  105  |  11.0  ----------------------------<  111  3.8   |  26.0  |  0.22<L>    Ca    9.3      2017 06:31    TPro  6.8  /  Alb  3.9  /  TBili  0.3<L>  /  DBili  x   /  AST  29  /  ALT  21  /  AlkPhos  98  11-21        PT/INR - ( 2017 16:49 )   PT: 11.6 sec;   INR: 1.05 ratio         PTT - ( 2017 16:49 )  PTT:34.1 sec  Urinalysis Basic - ( 2017 19:35 )    Color: Yellow / Appearance: Clear / S.010 / pH: x  Gluc: x / Ketone: Negative  / Bili: Negative / Urobili: Negative mg/dL   Blood: x / Protein: Negative mg/dL / Nitrite: Negative   Leuk Esterase: Negative / RBC: 3-5 /HPF / WBC Negative   Sq Epi: x / Non Sq Epi: Occasional / Bacteria: x      I&O's Summary      RADIOLOGY & ADDITIONAL STUDIES:

## 2017-11-22 NOTE — PROGRESS NOTE ADULT - PROBLEM SELECTOR PLAN 1
-Suspected vegetations noted on CLAYTON done as outpatient in cardiologists office  -Patient not a candidate for CLAYTON given her body habitus  -Cardiac MRI may be an option  -For now serial blood cultures  -ID consult requested as patient may need long term antibiotics -Suspected vegetations noted on CLAYTON done as outpatient in cardiologists office  -Patient not a candidate for CLAYTON given her body habitus/contractures  -pt afebrile   -no leukocytosis   -systolic murmur on exam   -Cardiac MRI may be an option  -For now serial blood cultures  -ID consult requested as patient may need long term antibiotics/ suppressive ABX therapy  -ID consult noted and appreciated and recommended against abx therapy at this time  -c/w probiotic

## 2017-11-22 NOTE — CONSULT NOTE ADULT - ASSESSMENT
77 year old woman with ALS, paroxsymal atrial fibrillation on xarelto, Takotsubo cardiomyopathy (recovered EF), prior DVT, with recent PEG placement complicated by bleed and subsequent abdominal hematoma who presented to her outpatient cardiologist with tachycardia, lethargy and hypotension found to have hyperdynamic LVEF with THELMA, RV dilatation, suspected vegetation of AV, MV and TV as well as severe MR and TR concerning for endocarditis     Hyperdynamic LVEF  - Patient is hypovolemic (poor PO intake and recent bleed with acute anemia) which is likely etiology of her hyperdynamic LVEF, continue IVF hydration with close monitoring of respiratory status given severe valvulopathy   - consider transfusion  - Given borderline BP, hold BB at this time   - monitor H/H    Severe MR/TR and suspected vegetation   - patient is a poor candidate for CLAYTON and surgical intervention; recommend serial blood cultures and ID consult  - empiric antibiotics   - telemetry monitoring     Paroxsymal atrial fibrillation  - given acute on chronic anemia and abdominal hematoma; hold anticoagulation       We will continue to follow this patient with you

## 2017-11-22 NOTE — PROGRESS NOTE ADULT - PROBLEM SELECTOR PLAN 4
-Hold xarelto given recent bleeding  -Hold BB given hypotension  -May resume BB when hypotension improves (metoprolol 25mg ER daily) -Hold xarelto given recent bleeding  -Hold BB given hypotension  -May resume BB when hypotension improves (metoprolol 12.5mg ER daily)

## 2017-11-22 NOTE — PROGRESS NOTE ADULT - SUBJECTIVE AND OBJECTIVE BOX
NIKKI RODRIGUEZ Patient is a 77y old  Female who presents with a chief complaint of fast heart rate. (2017 23:17)     HPI:  76 y/o female with sig. PMH of ALS who had PEG tube placement about 4 days ago at [facility] has been seen at [facility] ER twice for bleeding around her PEG tube and was discharged after bleeding was controlled. For past few days her HR has been high. On the day of ER visit pt. was seen at cardiologist dr. Hart office and she was noted to have HR in 130s to 140s . As per history she had an echo in the office and was noted to have a murmur and concern for vegetations. Pt. was sent to ER for r/o endocarditis. pt. did not have any fever. no chills. mild left lower abd. side discomfort. no blood per rectum reported. no n/v. no cough. pt's speech is very limited and not clear due to her advanced ALS. pt's  is at bedside and helping with history. In the ER pt. was noted to have Hb of 7.3 and on 17 it was 13.3.   Pt. has h/o afib and her Xeralto was stopped 2 days before her PEG procedure. As per  she sometimes uses cpap at night. (2017 23:17)    HPI 17:    patient seen and examined at the bedside along with Dr. Damon.  at bedside providing most history. Recent PEG placement last week, although patient does not need PEG for feeding. She usually eat puree diet with thin liquids. The PEG is for the future. She can also take medication orally (crushed). She was sent into the ER because the cardiologist may have noticed vegetations on her heart valve, she is also bleeding for her PEG tube site which has lead to a drop in H&H and an abdominal wall hematoma. ROS unable to obtain due to patient's status.       Weight (kg): 42.6 ( @ 16:37)I&O's Summary    Allergies    No Known Allergies    Intolerances      HEALTH ISSUES - PROBLEM Dx:        PAST MEDICAL & SURGICAL HISTORY:  BiPAP (biphasic positive airway pressure) dependence: at night pt has personal bipap  Kidney stones  Glaucoma  ALS (amyotrophic lateral sclerosis): no oxygen as per pt.  Breast tumor: removed L side  Bilateral cataracts: Eye lids surgery  Hunter surgery  H/O:           Vital Signs Last 24 Hrs  T(C): 36.4 (2017 03:32), Max: 36.6 (2017 00:06)  T(F): 97.6 (2017 03:32), Max: 97.8 (2017 00:06)  HR: 97 (2017 03:32) (97 - 133)  BP: 101/67 (2017 03:32) (98/56 - 131/77)  RR: 17 (2017 03:32) (14 - 24)  SpO2: 93% (2017 03:32) (93% - 100%)  PHYSICAL EXAM:    GENERAL: NAD, contracted, minimally verbal   HEAD:  Atraumatic, Normocephalic  EYES: EOMI, PERRLA, conjunctiva and sclera clear  ENMT:  Moist mucous membranes,  No lesions  NERVOUS SYSTEM:  Contracted unable to evaluate strength, minimally verbal unable to assess mental status, although  states she is at baseline  CHEST/LUNG: Clear to auscultation bilaterally; No rales, rhonchi, wheezing,   HEART: Regular rate and rhythm; No murmurs,   ABDOMEN: PEG tube site with evidence of dried blood, no active bleeding, abdomen is hard LLQ and LUQ, nontender  EXTREMITIES:  Peripheral Pulses, No  cyanosis, minimal edema b/l LE  SKIN: No rashes or lesions    brimonidine 0.2% Ophthalmic Solution 1 Drop(s) Both EYES three times a day  cefepime  IVPB 2000 milliGRAM(s) IV Intermittent every 8 hours  dextrose 5%. 1000 milliLiter(s) IV Continuous <Continuous>  dextrose 50% Injectable 12.5 Gram(s) IV Push once  dextrose 50% Injectable 25 Gram(s) IV Push once  dextrose 50% Injectable 25 Gram(s) IV Push once  dextrose Gel 1 Dose(s) Oral once PRN  dorzolamide 2% Ophthalmic Solution 1 Drop(s) Both EYES three times a day  dorzolamide 2% Ophthalmic Solution      glucagon  Injectable 1 milliGRAM(s) IntraMuscular once PRN  lactobacillus acidophilus 1 Tablet(s) Oral daily  pantoprazole  Injectable 40 milliGRAM(s) IV Push daily  timolol 0.5% Solution 1 Drop(s) Both EYES two times a day  vancomycin  IVPB 750 milliGRAM(s) IV Intermittent every 12 hours      LABS:  ABG - ( 2017 17:08 )  pH: 7.42  /  pCO2: 46    /  pO2: 78    / HCO3: 29    / Base Excess: 4.8   /  SaO2: 97                                      8.2    6.2   )-----------( 178      ( 2017 09:23 )             25.6     11-22    142  |  105  |  11.0  ----------------------------<  111  3.8   |  26.0  |  0.22<L>    Ca    9.3      2017 06:31    TPro  6.8  /  Alb  3.9  /  TBili  0.3<L>  /  DBili  x   /  AST  29  /  ALT  21  /  AlkPhos  98  11-    LIVER FUNCTIONS - ( 2017 16:49 )  Alb: 3.9 g/dL / Pro: 6.8 g/dL / ALK PHOS: 98 U/L / ALT: 21 U/L / AST: 29 U/L / GGT: x           PT/INR - ( 2017 16:49 )   PT: 11.6 sec;   INR: 1.05 ratio         PTT - ( 2017 16:49 )  PTT:34.1 sec      Urinalysis Basic - ( 2017 19:35 )    Color: Yellow / Appearance: Clear / S.010 / pH: x  Gluc: x / Ketone: Negative  / Bili: Negative / Urobili: Negative mg/dL   Blood: x / Protein: Negative mg/dL / Nitrite: Negative   Leuk Esterase: Negative / RBC: 3-5 /HPF / WBC Negative   Sq Epi: x / Non Sq Epi: Occasional / Bacteria: x    RADIOLOGY & ADDITIONAL TESTS:     EXAM:  CT ABDOMEN AND PELVIS IC                          PROCEDURE DATE:  2017          INTERPRETATION:  CLINICAL HISTORY: Rule out expanding hematoma bleeding   from PEG site.    COMPARISON: None.    TECHNIQUE: Contrast-enhanced CT of the abdomen and pelvis. Multiple   contiguous axial images images are submitted for interpretation with the   administration of 95 cc of Omnipaque 300 intravenous contrast. Oral   contrast was administered.    FINDINGS:  Mild bibasal atelectasis. Heart is enlarged. Small right-sided effusion.   The liver, gallbladder, spleen, adrenals, and adrenal glands are   unremarkable in appearance. Bilateral renal tiny cortical hypodensities,   likely representing cysts. Left kidney midpole 4 mm nonobstructive   nephrolithiasis.    PEG tube balloon is noted within the stomach. There is moderate fluid   adjacent to the PEG tube in the adjacent abdominal wall image 82, series   3, measures measures about 5.5 cm in its greatest axial dimension.     The urinary bladder is markedly distended. The uterus demonstrates some   dystrophic calcifications. Large amount of fecal material throughout   large bowel. No gross evidence of ileus or obstruction. Moderate to   severe abdominal aortic atherosclerotic calcifications. No acute osseous   abnormality.      IMPRESSION:    PEG tube balloon within the stomach. Moderate size left abdominal wall   fluid collection. No evidence of CT dye extravasation in the fluid   collection.        JOHN ELKINS M.D., ATTENDING RADIOLOGIST  This document has been electronically signed. 2017  8:45PM          Consultant notes reviewed    Case discussed with consultant/provider/ family /patient NIKKI RODRIGUEZ Patient is a 77y old  Female who presents with a chief complaint of fast heart rate. (2017 23:17)     HPI:  76 y/o female with sig. PMH of ALS who had PEG tube placement about 4 days ago at [facility] has been seen at [facility] ER twice for bleeding around her PEG tube and was discharged after bleeding was controlled. For past few days her HR has been high. On the day of ER visit pt. was seen at cardiologist dr. Hart office and she was noted to have HR in 130s to 140s . As per history she had an echo in the office and was noted to have a murmur and concern for vegetations. Pt. was sent to ER for r/o endocarditis. pt. did not have any fever. no chills. mild left lower abd. side discomfort. no blood per rectum reported. no n/v. no cough. pt's speech is very limited and not clear due to her advanced ALS. pt's  is at bedside and helping with history. In the ER pt. was noted to have Hb of 7.3 and on 17 it was 13.3.   Pt. has h/o afib and her Xeralto was stopped 2 days before her PEG procedure. As per  she sometimes uses cpap at night. (2017 23:17)    HPI 17:    patient seen and examined at the bedside along with Dr. Damon.  at bedside providing most history. Recent PEG placement last week, although patient does not need PEG for feeding. She usually eat puree diet with thin liquids. The PEG is for the future. She can also take medication orally (crushed). She was sent into the ER because the cardiologist may have noticed vegetations on her heart valve, she is also bleeding for her PEG tube site which has lead to a drop in H&H and an abdominal wall hematoma. ROS unable to obtain due to patient's status.       Weight (kg): 42.6 ( @ 16:37)I&O's Summary    Allergies    No Known Allergies    Intolerances      HEALTH ISSUES - PROBLEM Dx:        PAST MEDICAL & SURGICAL HISTORY:  BiPAP (biphasic positive airway pressure) dependence: at night pt has personal bipap  Kidney stones  Glaucoma  ALS (amyotrophic lateral sclerosis): no oxygen as per pt.  Breast tumor: removed L side  Bilateral cataracts: Eye lids surgery  Hunter surgery  H/O:           Vital Signs Last 24 Hrs  T(C): 36.4 (2017 03:32), Max: 36.6 (2017 00:06)  T(F): 97.6 (2017 03:32), Max: 97.8 (2017 00:06)  HR: 97 (2017 03:32) (97 - 133)  BP: 101/67 (2017 03:32) (98/56 - 131/77)  RR: 17 (2017 03:32) (14 - 24)  SpO2: 93% (2017 03:32) (93% - 100%)  PHYSICAL EXAM:    GENERAL: NAD, contracted, minimally verbal   HEAD:  Atraumatic, Normocephalic  EYES: EOMI, PERRLA, conjunctiva and sclera clear  ENMT:  Moist mucous membranes,  No lesions  NERVOUS SYSTEM:  Contracted unable to evaluate strength, minimally verbal unable to assess mental status, although  states she is at baseline  CHEST/LUNG: Clear to auscultation bilaterally; No rales, rhonchi, wheezing,   HEART: Regular rate and rhythm; systolic murmur   ABDOMEN: PEG tube site with evidence of dried blood, no active bleeding, abdomen is hard LLQ and LUQ, nontender  EXTREMITIES:  Peripheral Pulses, No  cyanosis, minimal edema b/l LE  SKIN: No rashes or lesions    brimonidine 0.2% Ophthalmic Solution 1 Drop(s) Both EYES three times a day  cefepime  IVPB 2000 milliGRAM(s) IV Intermittent every 8 hours  dextrose 5%. 1000 milliLiter(s) IV Continuous <Continuous>  dextrose 50% Injectable 12.5 Gram(s) IV Push once  dextrose 50% Injectable 25 Gram(s) IV Push once  dextrose 50% Injectable 25 Gram(s) IV Push once  dextrose Gel 1 Dose(s) Oral once PRN  dorzolamide 2% Ophthalmic Solution 1 Drop(s) Both EYES three times a day  dorzolamide 2% Ophthalmic Solution      glucagon  Injectable 1 milliGRAM(s) IntraMuscular once PRN  lactobacillus acidophilus 1 Tablet(s) Oral daily  pantoprazole  Injectable 40 milliGRAM(s) IV Push daily  timolol 0.5% Solution 1 Drop(s) Both EYES two times a day  vancomycin  IVPB 750 milliGRAM(s) IV Intermittent every 12 hours      LABS:  ABG - ( 2017 17:08 )  pH: 7.42  /  pCO2: 46    /  pO2: 78    / HCO3: 29    / Base Excess: 4.8   /  SaO2: 97                                      8.2    6.2   )-----------( 178      ( 2017 09:23 )             25.6     11-    142  |  105  |  11.0  ----------------------------<  111  3.8   |  26.0  |  0.22<L>    Ca    9.3      2017 06:31    TPro  6.8  /  Alb  3.9  /  TBili  0.3<L>  /  DBili  x   /  AST  29  /  ALT  21  /  AlkPhos  98  11-    LIVER FUNCTIONS - ( 2017 16:49 )  Alb: 3.9 g/dL / Pro: 6.8 g/dL / ALK PHOS: 98 U/L / ALT: 21 U/L / AST: 29 U/L / GGT: x           PT/INR - ( 2017 16:49 )   PT: 11.6 sec;   INR: 1.05 ratio         PTT - ( 2017 16:49 )  PTT:34.1 sec      Urinalysis Basic - ( 2017 19:35 )    Color: Yellow / Appearance: Clear / S.010 / pH: x  Gluc: x / Ketone: Negative  / Bili: Negative / Urobili: Negative mg/dL   Blood: x / Protein: Negative mg/dL / Nitrite: Negative   Leuk Esterase: Negative / RBC: 3-5 /HPF / WBC Negative   Sq Epi: x / Non Sq Epi: Occasional / Bacteria: x    RADIOLOGY & ADDITIONAL TESTS:     EXAM:  CT ABDOMEN AND PELVIS IC                          PROCEDURE DATE:  2017          INTERPRETATION:  CLINICAL HISTORY: Rule out expanding hematoma bleeding   from PEG site.    COMPARISON: None.    TECHNIQUE: Contrast-enhanced CT of the abdomen and pelvis. Multiple   contiguous axial images images are submitted for interpretation with the   administration of 95 cc of Omnipaque 300 intravenous contrast. Oral   contrast was administered.    FINDINGS:  Mild bibasal atelectasis. Heart is enlarged. Small right-sided effusion.   The liver, gallbladder, spleen, adrenals, and adrenal glands are   unremarkable in appearance. Bilateral renal tiny cortical hypodensities,   likely representing cysts. Left kidney midpole 4 mm nonobstructive   nephrolithiasis.    PEG tube balloon is noted within the stomach. There is moderate fluid   adjacent to the PEG tube in the adjacent abdominal wall image 82, series   3, measures measures about 5.5 cm in its greatest axial dimension.     The urinary bladder is markedly distended. The uterus demonstrates some   dystrophic calcifications. Large amount of fecal material throughout   large bowel. No gross evidence of ileus or obstruction. Moderate to   severe abdominal aortic atherosclerotic calcifications. No acute osseous   abnormality.      IMPRESSION:    PEG tube balloon within the stomach. Moderate size left abdominal wall   fluid collection. No evidence of CT dye extravasation in the fluid   collection.        JOHN ELKINS M.D., ATTENDING RADIOLOGIST  This document has been electronically signed. 2017  8:45PM          Consultant notes reviewed    Case discussed with consultant/provider/ family /patient

## 2017-11-22 NOTE — PROGRESS NOTE ADULT - ASSESSMENT
77 year old woman with ALS, paroxsymal atrial fibrillation on xarelto, Takotsubo cardiomyopathy (recovered EF), prior DVT, with recent PEG placement complicated by bleed and subsequent abdominal hematoma who presented to her outpatient cardiologist with tachycardia, lethargy and hypotension found to have hyperdynamic LVEF with THELMA, RV dilatation, suspected vegetation of AV, MV and TV as well as severe MR and TR concerning for endocarditis. Also found to have low H&H dropped from 13.3 to 7.3 this admission. S/p 1 unit prbc and some fluid with appropriate response. Appreciate cardiology consult as patient has severe valvular disease. cardio recommends giving fluids cautiously given valvular disease, if fluids are needed, small boluses should be given. CLAYTON cannot be done 2/2 patient being contracted, cardiac MRI may be an option to evaluate endocarditis however patient is not a surgical candidate  so further management would be complicated. ID consult requested as she may need long term antibiotics or life long abx for suppression. Palliative care was also asked to come and talk to the patient as well as family. Patient can take PO Puree with thin liquid, crush meds, aspiration precautions. Will monitor H&H and BP. Hold metoprolol given hypotension.

## 2017-11-22 NOTE — PROGRESS NOTE ADULT - PROBLEM SELECTOR PLAN 3
-patient with bleeding form PEG tube site  -Bleeding seems controlled now  -CT abd/pelvis with evidence of abdominal fluid collection which is most likely a hematoma  - states PEG tube flushes well  -Patient able to take PO (puree with thin liquids)  -Will get GI eval

## 2017-11-22 NOTE — CONSULT NOTE ADULT - ASSESSMENT
IMP  PEG SITE HEMATOMA  NO CLINICAL EVIDENCE OF INFECTION  NL EVAL PEG SITE  NL CBC  PLAN  DEFER IV ABS  OBSERVE

## 2017-11-22 NOTE — CONSULT NOTE ADULT - ATTENDING COMMENTS
76 yo female w/ ALS s/p PEG at an outside institution recently; complicated by post-PEG bleeding x2.  Has since been sent home.   noted tachycardia to 130s (takes vitals at home), took her to her cardiologist's office.  Patient was then sent to the ED for further evaluation.  Was on anticoagulation (Xaralto) but this was stopped prior to the PEG.  Admitted to medicine.  Noted to have an abdominal wall hematoma near the PEG site on imaging, ACS consulted to evaluate the abdominal wall hematoma.  Afebrile.  HD normal.  Hgb 7.3, receiving 1 U PRBC.  NAD.  Abdomen: S/NT/ND, PEG in place, small hematoma noted in abdominal wall.  Hold anticoagulation.  Agree w/ transfusion.  Serial CBC.  No surgical intervention at this time. Will follow.

## 2017-11-22 NOTE — CONSULT NOTE ADULT - SUBJECTIVE AND OBJECTIVE BOX
[facility]  MICHELLE TREVIÑO MD FACP   PEPE YAP MD  Diplomates American Board of Internal Medicine and Infecctious Diseases      MRN-[id number]  NIKKI RODRIGUEZ is a 77y  Female     CC:  ALS X 7 YEARS  STILL TAKE SPO  ELECTIVE PEG PLACE LAST WEEK  BLEEDING FROM TUBE  ER EVAL  CONCERN FOR INFECTION        Past Medical & Surgical Hx:  PAST MEDICAL & SURGICAL HISTORY:  BiPAP (biphasic positive airway pressure) dependence: at night pt has personal bipap  Kidney stones  Glaucoma  ALS (amyotrophic lateral sclerosis): no oxygen as per pt.  Breast tumor: removed L side  Bilateral cataracts: Eye lids surgery  Hunter surgery  H/O:       Problem List:  HEALTH ISSUES - PROBLEM Dx:  Prophylactic measure: Prophylactic measure  ALS (amyotrophic lateral sclerosis): ALS (amyotrophic lateral sclerosis)  Paroxysmal atrial fibrillation: Paroxysmal atrial fibrillation  PEG tube malfunction: PEG tube malfunction  Blood loss anemia: Blood loss anemia            Allergies    No Known Allergies    Intolerances          ANTIBIOTICS:        Review of Systems: - Negative except as mentioned below  MIN PAIN AROUND PEG  NO FEVER,CHILLS    Physical Exam:    Vital Signs Last 24 Hrs  T(C): 36.7 (2017 10:19), Max: 36.7 (2017 10:19)  T(F): 98.1 (2017 10:19), Max: 98.1 (2017 10:19)  HR: 83 (2017 10:19) (83 - 133)  BP: 111/62 (2017 10:19) (98/56 - 131/77)  BP(mean): --  RR: 18 (2017 10:19) (14 - 24)  SpO2: 99% (2017 10:19) (93% - 100%)      Weight (kg): 42.6 (11-21 @ 16:37)    GEN: NAD, pleasant  HEENT: normocephalic and atraumatic. EOMI. TACHO. Moist mucosa. Clear Posterior pharynx.  NECK: Supple. No carotid bruits.  No lymphadenopathy or thyromegaly.  LUNGS: Clear to auscultation.  HEART: Regular rate and rhythm without murmur.  ABDOMEN:PEG SITE CLEAN, NO REDNESS, FLUCTUANCE OR ERYTHEMA. NO DRAINAGE FROM PEG SITE.  EXTREMITIES: Without any cyanosis, clubbing, rash, lesions or edema.  NEUROLOGIC: Cranial nerves II through XII are grossly intact.  MUSCULOSKELETAL:  SKIN: No ulceration or induration present.      Labs:                        8.2    6.2   )-----------( 178      ( 2017 09:23 )             25.6         142  |  105  |  11.0  ----------------------------<  111  3.8   |  26.0  |  0.22<L>    Ca    9.3      2017 06:31    TPro  6.8  /  Alb  3.9  /  TBili  0.3<L>  /  DBili  x   /  AST  29  /  ALT  21  /  AlkPhos  98      PT/INR - ( 2017 16:49 )   PT: 11.6 sec;   INR: 1.05 ratio         PTT - ( 2017 16:49 )  PTT:34.1 sec  Urinalysis Basic - ( 2017 19:35 )    Color: Yellow / Appearance: Clear / S.010 / pH: x  Gluc: x / Ketone: Negative  / Bili: Negative / Urobili: Negative mg/dL   Blood: x / Protein: Negative mg/dL / Nitrite: Negative   Leuk Esterase: Negative / RBC: 3-5 /HPF / WBC Negative   Sq Epi: x / Non Sq Epi: Occasional / Bacteria: x        Platelet Count - Automated: 178 K/uL ( @ 09:23)  Hemoglobin: 8.2 g/dL ( 09:23)  WBC Count: 6.2 K/uL ( 09:23)  Hematocrit: 25.6 % ( 09:23)  Hemoglobin: 8.4 g/dL ( 06:31)  Hematocrit: 25.7 % ( 06:31)  Hemoglobin: 7.3 g/dL ( 22:09)  WBC Count: 7.9 K/uL ( 22:09)  Hematocrit: 22.8 % ( 22:09)  Platelet Count - Automated: 210 K/uL ( 22:09)  Platelet Count - Automated: 224 K/uL ( @ 16:49)  Hemoglobin: 8.3 g/dL ( 16:49)  WBC Count: 7.4 K/uL (11-21 @ 16:49)  Hematocrit: 26.0 % ( @ 16:49)    Sodium, Serum: 142 mmol/L ( @ 06:31)  Potassium, Serum: 3.8 mmol/L ( @ 06:31)  Blood Urea Nitrogen, Serum: 11.0 mg/dL ( @ :31)  Potassium, Serum: 4.7 mmol/L ( @ 16:49)  Blood Urea Nitrogen, Serum: 19.0 mg/dL ( @ :49)  Sodium, Serum: 140 mmol/L ( @ 16:49)          MICROBIOLOGY        RADIOLOGY    < from: CT Abdomen and Pelvis w/ IV Cont (17 @ 20:14) >  PEG tube balloon is noted within the stomach. There is moderate fluid   adjacent to the PEG tube in the adjacent abdominal wall image 82, series   3, measures measures about 5.5 cm in its greatest axial dimension.     The urinary bladder is markedly distended. The uterus demonstrates some   dystrophic calcifications. Large amount of fecal material throughout   large bowel. No gross evidence of ileus or obstruction. Moderate to   severe abdominal aortic atherosclerotic calcifications. No acute osseous   abnormality.      IMPRESSION:    PEG tube balloon within the stomach. Moderate size left abdominal wall   fluid collection. No evidence of CT dye extravasation in the fluid   collection.      < end of copied text >            MICHELLE TREVIÑO MD FACP    ~KAMILA

## 2017-11-22 NOTE — CONSULT NOTE ADULT - SUBJECTIVE AND OBJECTIVE BOX
76 y/o F with H/O ALS was consulted by the primary admitting team for ant abdominal wall hematoma  As per the pt family she has been suffering from ALS from . as her funtion deteriorated she was adviced to get a PEG tube for nutritional support. She received the PEG tube at a different facility on the 17 of this month. after the placement of the PEG tube pt has visited the ED 2 times complaining of bleeding around the PEG tube site, She was managed in the ED and discharged home. pt on xeralto for DVT and was stopped 2 days before placement of PEG tube  Pt gets her vitals monitored in her home by her  and when he noticed that she was having HR of 130 today he took her to cardiologist and from there she was trensferred to ED for further evaluation.  pt has some level of communication, and upon asking and with the help of her , states that she is not having any pain in her abdomen'  PMH: ALS  PSH: 3 Csection     MEDICATIONS  (STANDING):  brimonidine 0.2% Ophthalmic Solution 1 Drop(s) Both EYES three times a day  cefepime  IVPB 2000 milliGRAM(s) IV Intermittent every 8 hours  dorzolamide 2% Ophthalmic Solution 1 Drop(s) Both EYES three times a day  dorzolamide 2% Ophthalmic Solution      furosemide   Injectable 20 milliGRAM(s) IV Push once  pantoprazole  Injectable 40 milliGRAM(s) IV Push daily  sodium chloride 0.9%. 1000 milliLiter(s) (60 mL/Hr) IV Continuous <Continuous>  timolol 0.5% Solution 1 Drop(s) Both EYES two times a day  vancomycin  IVPB 750 milliGRAM(s) IV Intermittent every 12 hours    MEDICATIONS  (PRN):      Vital Signs Last 24 Hrs  T(C): 36.4 (2017 03:32), Max: 36.6 (2017 00:06)  T(F): 97.6 (2017 03:32), Max: 97.8 (2017 00:06)  HR: 97 (2017 03:32) (97 - 133)  BP: 101/67 (2017 03:32) (98/56 - 131/77)  BP(mean): --  RR: 17 (2017 03:32) (14 - 24)  SpO2: 93% (2017 03:32) (93% - 100%)    OE:  pt alert, unable to communiacte properly due to ALS  CNS: HMF could not be evaluated  Chest: Ctab  CVS: s1s2  abd: soft, peg in place, no guarding and rigidity  skin: intact      I&O's Detail      LABS:                        7.3    7.9   )-----------( 210      ( 2017 22:09 )             22.8     11-21    140  |  99  |  19.0  ----------------------------<  94  4.7   |  30.0<H>  |  0.22<L>    Ca    10.5<H>      2017 16:49    TPro  6.8  /  Alb  3.9  /  TBili  0.3<L>  /  DBili  x   /  AST  29  /  ALT  21  /  AlkPhos  98  11-21    PT/INR - ( 2017 16:49 )   PT: 11.6 sec;   INR: 1.05 ratio         PTT - ( 2017 16:49 )  PTT:34.1 sec  Urinalysis Basic - ( 2017 19:35 )    Color: Yellow / Appearance: Clear / S.010 / pH: x  Gluc: x / Ketone: Negative  / Bili: Negative / Urobili: Negative mg/dL   Blood: x / Protein: Negative mg/dL / Nitrite: Negative   Leuk Esterase: Negative / RBC: 3-5 /HPF / WBC Negative   Sq Epi: x / Non Sq Epi: Occasional / Bacteria: x        RADIOLOGY & ADDITIONAL STUDIES: 78 y/o F with H/O ALS was consulted by the primary admitting team for ant abdominal wall hematoma  As per the pt family she has been suffering from ALS from . as her function deteriorated she was adviced to get a PEG tube for nutritional support. She received the PEG tube at a different facility on the 17 of this month. after the placement of the PEG tube pt has visited the ED 2 times complaining of bleeding around the PEG tube site, She was managed in the ED and discharged home. pt on xeralto for DVT and was stopped 2 days before placement of PEG tube  Pt gets her vitals monitored in her home by her  and when he noticed that she was having HR of 130 today he took her to cardiologist and from there she was trensferred to ED for further evaluation.  pt has some level of communication, and upon asking and with the help of her , states that she is not having any pain in her abdomen'  PMH: ALS  PSH: 3 Csection     MEDICATIONS  (STANDING):  brimonidine 0.2% Ophthalmic Solution 1 Drop(s) Both EYES three times a day  cefepime  IVPB 2000 milliGRAM(s) IV Intermittent every 8 hours  dorzolamide 2% Ophthalmic Solution 1 Drop(s) Both EYES three times a day  dorzolamide 2% Ophthalmic Solution      furosemide   Injectable 20 milliGRAM(s) IV Push once  pantoprazole  Injectable 40 milliGRAM(s) IV Push daily  sodium chloride 0.9%. 1000 milliLiter(s) (60 mL/Hr) IV Continuous <Continuous>  timolol 0.5% Solution 1 Drop(s) Both EYES two times a day  vancomycin  IVPB 750 milliGRAM(s) IV Intermittent every 12 hours    MEDICATIONS  (PRN):      Vital Signs Last 24 Hrs  T(C): 36.4 (2017 03:32), Max: 36.6 (2017 00:06)  T(F): 97.6 (2017 03:32), Max: 97.8 (2017 00:06)  HR: 97 (2017 03:32) (97 - 133)  BP: 101/67 (2017 03:32) (98/56 - 131/77)  BP(mean): --  RR: 17 (2017 03:32) (14 - 24)  SpO2: 93% (2017 03:32) (93% - 100%)    OE:  pt alert, unable to communiacte properly due to ALS  CNS: HMF could not be evaluated  Chest: Ctab  CVS: s1s2  abd: soft, peg in place, no guarding and rigidity  skin: intact      I&O's Detail      LABS:                        7.3    7.9   )-----------( 210      ( 2017 22:09 )             22.8     11-21    140  |  99  |  19.0  ----------------------------<  94  4.7   |  30.0<H>  |  0.22<L>    Ca    10.5<H>      2017 16:49    TPro  6.8  /  Alb  3.9  /  TBili  0.3<L>  /  DBili  x   /  AST  29  /  ALT  21  /  AlkPhos  98  11-21    PT/INR - ( 2017 16:49 )   PT: 11.6 sec;   INR: 1.05 ratio         PTT - ( 2017 16:49 )  PTT:34.1 sec  Urinalysis Basic - ( 2017 19:35 )    Color: Yellow / Appearance: Clear / S.010 / pH: x  Gluc: x / Ketone: Negative  / Bili: Negative / Urobili: Negative mg/dL   Blood: x / Protein: Negative mg/dL / Nitrite: Negative   Leuk Esterase: Negative / RBC: 3-5 /HPF / WBC Negative   Sq Epi: x / Non Sq Epi: Occasional / Bacteria: x        RADIOLOGY & ADDITIONAL STUDIES:

## 2017-11-22 NOTE — CONSULT NOTE ADULT - ASSESSMENT
76 y/o F with H/O ALS s/p PEG tube placement with left ant abd wall fluid collection on CT possible hematoma  - no acute surgical intervention needed at the moment  - pt receiving 1 unit of PRBC as per primary team  - trend H/H regularly  - serial abd exam  - surgery will follow

## 2017-11-23 DIAGNOSIS — R93.1 ABNORMAL FINDINGS ON DIAGNOSTIC IMAGING OF HEART AND CORONARY CIRCULATION: ICD-10-CM

## 2017-11-23 DIAGNOSIS — K94.20 GASTROSTOMY COMPLICATION, UNSPECIFIED: ICD-10-CM

## 2017-11-23 DIAGNOSIS — K59.00 CONSTIPATION, UNSPECIFIED: ICD-10-CM

## 2017-11-23 DIAGNOSIS — E87.6 HYPOKALEMIA: ICD-10-CM

## 2017-11-23 LAB
ANION GAP SERPL CALC-SCNC: 9 MMOL/L — SIGNIFICANT CHANGE UP (ref 5–17)
BUN SERPL-MCNC: 9 MG/DL — SIGNIFICANT CHANGE UP (ref 8–20)
CALCIUM SERPL-MCNC: 9.2 MG/DL — SIGNIFICANT CHANGE UP (ref 8.6–10.2)
CHLORIDE SERPL-SCNC: 103 MMOL/L — SIGNIFICANT CHANGE UP (ref 98–107)
CO2 SERPL-SCNC: 28 MMOL/L — SIGNIFICANT CHANGE UP (ref 22–29)
CREAT SERPL-MCNC: 0.31 MG/DL — LOW (ref 0.5–1.3)
GLUCOSE SERPL-MCNC: 105 MG/DL — SIGNIFICANT CHANGE UP (ref 70–115)
HCT VFR BLD CALC: 24.6 % — LOW (ref 37–47)
HCT VFR BLD CALC: 25 % — LOW (ref 37–47)
HGB BLD-MCNC: 8.1 G/DL — LOW (ref 12–16)
HGB BLD-MCNC: 8.2 G/DL — LOW (ref 12–16)
MAGNESIUM SERPL-MCNC: 1.9 MG/DL — SIGNIFICANT CHANGE UP (ref 1.6–2.6)
MCHC RBC-ENTMCNC: 29.6 PG — SIGNIFICANT CHANGE UP (ref 27–31)
MCHC RBC-ENTMCNC: 32.9 G/DL — SIGNIFICANT CHANGE UP (ref 32–36)
MCV RBC AUTO: 89.8 FL — SIGNIFICANT CHANGE UP (ref 81–99)
PLATELET # BLD AUTO: 205 K/UL — SIGNIFICANT CHANGE UP (ref 150–400)
POTASSIUM SERPL-MCNC: 3.3 MMOL/L — LOW (ref 3.5–5.3)
POTASSIUM SERPL-SCNC: 3.3 MMOL/L — LOW (ref 3.5–5.3)
RBC # BLD: 2.74 M/UL — LOW (ref 4.4–5.2)
RBC # FLD: 17.4 % — HIGH (ref 11–15.6)
SODIUM SERPL-SCNC: 140 MMOL/L — SIGNIFICANT CHANGE UP (ref 135–145)
WBC # BLD: 5.8 K/UL — SIGNIFICANT CHANGE UP (ref 4.8–10.8)
WBC # FLD AUTO: 5.8 K/UL — SIGNIFICANT CHANGE UP (ref 4.8–10.8)

## 2017-11-23 PROCEDURE — 99232 SBSQ HOSP IP/OBS MODERATE 35: CPT

## 2017-11-23 PROCEDURE — 99233 SBSQ HOSP IP/OBS HIGH 50: CPT

## 2017-11-23 RX ORDER — DIGOXIN 250 MCG
0.25 TABLET ORAL DAILY
Qty: 0 | Refills: 0 | Status: DISCONTINUED | OUTPATIENT
Start: 2017-11-23 | End: 2017-11-25

## 2017-11-23 RX ORDER — METOPROLOL TARTRATE 50 MG
5 TABLET ORAL ONCE
Qty: 0 | Refills: 0 | Status: COMPLETED | OUTPATIENT
Start: 2017-11-23 | End: 2017-11-23

## 2017-11-23 RX ORDER — LACTULOSE 10 G/15ML
15 SOLUTION ORAL ONCE
Qty: 0 | Refills: 0 | Status: DISCONTINUED | OUTPATIENT
Start: 2017-11-23 | End: 2017-11-25

## 2017-11-23 RX ORDER — SENNA PLUS 8.6 MG/1
10 TABLET ORAL AT BEDTIME
Qty: 0 | Refills: 0 | Status: DISCONTINUED | OUTPATIENT
Start: 2017-11-23 | End: 2017-11-25

## 2017-11-23 RX ORDER — POLYETHYLENE GLYCOL 3350 17 G/17G
17 POWDER, FOR SOLUTION ORAL DAILY
Qty: 0 | Refills: 0 | Status: DISCONTINUED | OUTPATIENT
Start: 2017-11-23 | End: 2017-11-25

## 2017-11-23 RX ORDER — METOPROLOL TARTRATE 50 MG
12.5 TABLET ORAL THREE TIMES A DAY
Qty: 0 | Refills: 0 | Status: DISCONTINUED | OUTPATIENT
Start: 2017-11-23 | End: 2017-11-24

## 2017-11-23 RX ORDER — METOPROLOL TARTRATE 50 MG
12.5 TABLET ORAL DAILY
Qty: 0 | Refills: 0 | Status: DISCONTINUED | OUTPATIENT
Start: 2017-11-23 | End: 2017-11-23

## 2017-11-23 RX ORDER — POTASSIUM CHLORIDE 20 MEQ
40 PACKET (EA) ORAL ONCE
Qty: 0 | Refills: 0 | Status: COMPLETED | OUTPATIENT
Start: 2017-11-23 | End: 2017-11-23

## 2017-11-23 RX ORDER — DOCUSATE SODIUM 100 MG
100 CAPSULE ORAL
Qty: 0 | Refills: 0 | Status: DISCONTINUED | OUTPATIENT
Start: 2017-11-23 | End: 2017-11-23

## 2017-11-23 RX ADMIN — DORZOLAMIDE HYDROCHLORIDE 1 DROP(S): 20 SOLUTION/ DROPS OPHTHALMIC at 13:26

## 2017-11-23 RX ADMIN — BRIMONIDINE TARTRATE 1 DROP(S): 2 SOLUTION/ DROPS OPHTHALMIC at 18:44

## 2017-11-23 RX ADMIN — Medication 0.25 MILLIGRAM(S): at 22:32

## 2017-11-23 RX ADMIN — Medication 12.5 MILLIGRAM(S): at 13:24

## 2017-11-23 RX ADMIN — DORZOLAMIDE HYDROCHLORIDE 1 DROP(S): 20 SOLUTION/ DROPS OPHTHALMIC at 05:35

## 2017-11-23 RX ADMIN — BRIMONIDINE TARTRATE 1 DROP(S): 2 SOLUTION/ DROPS OPHTHALMIC at 05:35

## 2017-11-23 RX ADMIN — Medication 1 TABLET(S): at 13:27

## 2017-11-23 RX ADMIN — BRIMONIDINE TARTRATE 1 DROP(S): 2 SOLUTION/ DROPS OPHTHALMIC at 22:32

## 2017-11-23 RX ADMIN — Medication 12.5 MILLIGRAM(S): at 22:31

## 2017-11-23 RX ADMIN — PANTOPRAZOLE SODIUM 40 MILLIGRAM(S): 20 TABLET, DELAYED RELEASE ORAL at 18:43

## 2017-11-23 RX ADMIN — Medication 1 DROP(S): at 05:35

## 2017-11-23 RX ADMIN — Medication 40 MILLIEQUIVALENT(S): at 13:24

## 2017-11-23 RX ADMIN — SENNA PLUS 10 MILLILITER(S): 8.6 TABLET ORAL at 13:24

## 2017-11-23 RX ADMIN — Medication 5 MILLIGRAM(S): at 04:51

## 2017-11-23 NOTE — PROGRESS NOTE ADULT - PROBLEM SELECTOR PLAN 5
-c/w timolol/ dorzalamide and alphagan drops Progressive   -Continue current management with supportive care   -Patient able to take PO and eats pureed diet at home   -c/w Aspiration precautions  -Palliative care consult to continue goals of care discussion. The  is the primary care taker, patient herself voices her opinion to him. Very resistant with discussing her advanced directives and is very involved in her care and treatment plan. Ongoing discussions with the  in regards to her quality of life. Progressive   -Continue current management with supportive care   -Patient able to take PO and eats pureed/thin liquids diet at home   -c/w Aspiration precautions  -Palliative care consult to continue goals of care discussion. The  is the primary care taker, patient herself voices her opinion to him. Very resistant with discussing her advanced directives and is very involved in her care and treatment plan. Ongoing discussions with the  in regards to her quality of life. Progressive   -Continue current management with supportive care   -Patient able to take PO and eats pureed/thin liquids diet at home   -c/w Aspiration precautions  -Palliative care consult to continue goals of care discussion. The  is the primary care taker, patient herself voices her opinion to him. He is very involved in her care and treatment plan. Ongoing discussions with the  in regards to her quality of life.  -Advanced directives DNR/DNI

## 2017-11-23 NOTE — PROGRESS NOTE ADULT - SUBJECTIVE AND OBJECTIVE BOX
INTERVAL HPI/OVERNIGHT EVENTS: no acute events, patient is doing well.           MEDICATIONS  (STANDING):  brimonidine 0.2% Ophthalmic Solution 1 Drop(s) Both EYES three times a day  dextrose 5%. 1000 milliLiter(s) (50 mL/Hr) IV Continuous <Continuous>  dextrose 50% Injectable 12.5 Gram(s) IV Push once  dextrose 50% Injectable 25 Gram(s) IV Push once  dextrose 50% Injectable 25 Gram(s) IV Push once  dorzolamide 2% Ophthalmic Solution 1 Drop(s) Both EYES three times a day  dorzolamide 2% Ophthalmic Solution      lactobacillus acidophilus 1 Tablet(s) Oral daily  metoprolol succinate ER 12.5 milliGRAM(s) Oral daily  pantoprazole  Injectable 40 milliGRAM(s) IV Push daily  potassium chloride   Powder 40 milliEquivalent(s) Oral once  timolol 0.5% Solution 1 Drop(s) Both EYES two times a day    MEDICATIONS  (PRN):  dextrose Gel 1 Dose(s) Oral once PRN Blood Glucose LESS THAN 70 milliGRAM(s)/deciLiter  glucagon  Injectable 1 milliGRAM(s) IntraMuscular once PRN Glucose <70 milliGRAM(s)/deciLiter  lactulose Syrup 15 Gram(s) Oral once PRN if no bowel movement today with other constipation meds  polyethylene glycol 3350 17 Gram(s) Oral daily PRN Constipation  senna Syrup 10 milliLiter(s) Oral at bedtime PRN Constipation      Vital Signs Last 24 Hrs  T(C): 36.9 (2017 07:50), Max: 37 (2017 23:50)  T(F): 98.5 (2017 07:50), Max: 98.6 (2017 23:50)  HR: 113 (2017 07:50) (88 - 122)  BP: 125/77 (2017 07:50) (97/54 - 160/96)  BP(mean): --  RR: 18 (2017 07:50) (16 - 19)  SpO2: 99% (2017 07:50) (98% - 100%)    Physical Exam:  pt alert, unable to communiacte properly due to ALS  CNS: HMF could not be evaluated  Chest: Ctab  CVS: s1s2  abd: soft, peg in place, no guarding and rigidity, no sign of bleeding.   skin: intact    I&O's Detail      LABS:                        8.1    5.8   )-----------( 205      ( 2017 06:07 )             24.6     11-    140  |  103  |  9.0  ----------------------------<  105  3.3<L>   |  28.0  |  0.31<L>    Ca    9.2      2017 06:07  Mg     1.9         TPro  6.8  /  Alb  3.9  /  TBili  0.3<L>  /  DBili  x   /  AST  29  /  ALT  21  /  AlkPhos  98  11-21    PT/INR - ( 2017 16:49 )   PT: 11.6 sec;   INR: 1.05 ratio         PTT - ( 2017 16:49 )  PTT:34.1 sec  Urinalysis Basic - ( 2017 19:35 )    Color: Yellow / Appearance: Clear / S.010 / pH: x  Gluc: x / Ketone: Negative  / Bili: Negative / Urobili: Negative mg/dL   Blood: x / Protein: Negative mg/dL / Nitrite: Negative   Leuk Esterase: Negative / RBC: 3-5 /HPF / WBC Negative   Sq Epi: x / Non Sq Epi: Occasional / Bacteria: x        RADIOLOGY & ADDITIONAL STUDIES:

## 2017-11-23 NOTE — PROGRESS NOTE ADULT - ASSESSMENT
77 year old woman with ALS, paroxsymal atrial fibrillation on xarelto, Takotsubo cardiomyopathy (recovered EF), prior DVT, with recent PEG placement complicated by bleed and subsequent abdominal hematoma who presented to her outpatient cardiologist with tachycardia, lethargy and hypotension found to have hyperdynamic LVEF with THELMA, RV dilatation, suspected vegetation of AV, MV and TV as well as severe MR and TR concerning for endocarditis. Also found to have low H&H dropped from 13.3 to 7.3 this admission. S/p 1 unit prbc and some fluid with appropriate response. Cardiology consulted as patient has severe valvular disease. Bcx x 3 ordered to rule out endocarditis and ID consulted. Patient is a poor candidate for CLAYTON given that she is contracted. Pt may need long term antibiotics or life long abx for suppression. Palliative care consulted for goals of care due to the husbands resistance in regards to her goals of care. 77 year old woman with ALS, paroxsymal atrial fibrillation on xarelto, Takotsubo cardiomyopathy (recovered EF), prior DVT, with recent PEG placement complicated by bleed and subsequent abdominal hematoma who presented to her outpatient cardiologist with tachycardia, lethargy and hypotension found to have hyperdynamic LVEF with THELMA, RV dilatation, suspected vegetation of AV, MV and TV as well as severe MR and TR concerning for endocarditis. Also found to have low H&H dropped from 13.3 to 7.3 this admission. S/p 1 unit prbc on 11/21/17 and some fluid with appropriate response. Cardiology consulted and continues to follow the pt. Bcx x 3 ordered to rule out endocarditis and ID consulted. Patient is a poor candidate for CLAYTON given that she is contracted. Pt may need long term antibiotics or life long abx for suppression dependent on repeat TTE and Bcx findings as per ID. Palliative care consulted for goals of care due to the husbands resistance in regards to her goals of care. 77 year old woman with ALS, paroxsymal atrial fibrillation on xarelto, Takotsubo cardiomyopathy (recovered EF), prior DVT, with recent PEG placement complicated by bleed and subsequent abdominal hematoma who presented to her outpatient cardiologist with tachycardia, lethargy and hypotension found to have hyperdynamic LVEF with THELMA, RV dilatation, suspected vegetation of AV, MV and TV as well as severe MR and TR concerning for endocarditis. Also found to have low H&H dropped from 13.3 to 7.3 this admission. S/p 1 unit prbc on 11/21/17 and some fluid with appropriate response. Cardiology consulted and continues to follow the pt. Bcx x 3 ordered to rule out endocarditis and ID consulted. Patient is a poor candidate for CLAYTON given that she is contracted. Pt may need long term antibiotics or life long abx for suppression dependent on repeat TTE and Bcx findings as per ID. Palliative care consulted for patients quality of life.

## 2017-11-23 NOTE — PROGRESS NOTE ADULT - PROBLEM SELECTOR PLAN 1
-Suspected vegetations noted on CLAYTON done as outpatient in cardiologists office  -Patient not a candidate for CLAYTON given her body habitus/contractures  -pt afebrile   -no leukocytosis   -systolic murmur on exam   -Cardiac MRI was recommended but is not performed in [facility]   -For now serial blood cultures which are pending   -patient may need long term antibiotics/ suppressive ABX therapy depending on findings   -ID consult noted and appreciated and recommended against abx therapy at this time  -c/w probiotic -Suspected vegetations noted on CLAYTON done as outpatient in cardiologists office  -Patient not a candidate for CLAYTON given her body habitus/contractures  -pt afebrile   -no leukocytosis   -systolic murmur on exam   -Cardiac MRI was recommended but is not performed in [facility]   -For now serial blood cultures which are pending and tte ordered   -patient may need long term antibiotics/ suppressive ABX therapy depending on findings   -ID consult noted and appreciated and recommended against abx therapy at this time and repeat testing as above   -c/w probiotic

## 2017-11-23 NOTE — PROGRESS NOTE ADULT - ATTENDING COMMENTS
Dispo: Depends on patients clinical course. D/w  the plan of care and clinical course. Dispo: Depends on patients clinical course. D/w  the plan of care and clinical course. PT consulted to get the patient OOB to chair, at home the  patient uses a powered wheelchair.

## 2017-11-23 NOTE — PROGRESS NOTE ADULT - PROBLEM SELECTOR PLAN 4
Progressive   -Continue current management with supportive care   -Patient able to take PO and eats pureed diet at home   -c/w Aspiration precautions  -Palliative care consult to continue goals of care discussion. The  is the primary care taker, patient herself voices her opinion to him. Very resistant with discussing her advanced directives and is very involved in her care and treatment plan. Ongoing discussions with the  in regards to her quality of life. -Holding xarelto given current bleeding and likely pt is no longer a candidate for AC   -Episodes of RVR overnight and low dose IV metoprolol given   -restarted home medication metoprolol 12.5mg po qd with parameters   -cardio f/u noted and appreciated -Holding xarelto given current bleeding and likely pt is no longer a candidate for AC   -Episodes of RVR overnight and low dose IV metoprolol given   -c/w metoprolol increased dose 12.5mg po tid and digoxin added as per cardiology   -cardio f/u noted and appreciated

## 2017-11-23 NOTE — PROGRESS NOTE ADULT - SUBJECTIVE AND OBJECTIVE BOX
[facility] - [facility], THE HEART CENTER                                   [address] [place] [address]                                                      PHONE: [phone]                                                         FAX: [phone]  [email or web address]  ---------------------------------------------------------------------------------------------------------------------------------    FU for  Pt seen and examined.     Overnight events/patient complaints:    No Known Allergies    MEDICATIONS  (STANDING):  brimonidine 0.2% Ophthalmic Solution 1 Drop(s) Both EYES three times a day  dextrose 5%. 1000 milliLiter(s) (50 mL/Hr) IV Continuous <Continuous>  dextrose 50% Injectable 12.5 Gram(s) IV Push once  dextrose 50% Injectable 25 Gram(s) IV Push once  dextrose 50% Injectable 25 Gram(s) IV Push once  dorzolamide 2% Ophthalmic Solution 1 Drop(s) Both EYES three times a day  dorzolamide 2% Ophthalmic Solution      lactobacillus acidophilus 1 Tablet(s) Oral daily  metoprolol succinate ER 12.5 milliGRAM(s) Oral daily  pantoprazole  Injectable 40 milliGRAM(s) IV Push daily  potassium chloride   Powder 40 milliEquivalent(s) Oral once  timolol 0.5% Solution 1 Drop(s) Both EYES two times a day    MEDICATIONS  (PRN):  dextrose Gel 1 Dose(s) Oral once PRN Blood Glucose LESS THAN 70 milliGRAM(s)/deciLiter  glucagon  Injectable 1 milliGRAM(s) IntraMuscular once PRN Glucose <70 milliGRAM(s)/deciLiter  lactulose Syrup 15 Gram(s) Oral once PRN if no bowel movement today with other constipation meds  polyethylene glycol 3350 17 Gram(s) Oral daily PRN Constipation  senna Syrup 10 milliLiter(s) Oral at bedtime PRN Constipation      Vital Signs Last 24 Hrs  T(C): 36.9 (2017 07:50), Max: 37 (2017 23:50)  T(F): 98.5 (2017 07:50), Max: 98.6 (2017 23:50)  HR: 113 (2017 07:50) (88 - 122)  BP: 125/77 (2017 07:50) (97/54 - 160/96)  BP(mean): --  RR: 18 (2017 07:50) (16 - 19)  SpO2: 99% (2017 07:50) (98% - 100%)  ICU Vital Signs Last 24 Hrs  I&O's Summary      PHYSICAL EXAM:  HEENT: no JVD  CARDIOVASCULAR: Normal S1 and S2, No murmur, rub, gallop or lift.   LUNGS: No rales, rhonchi or wheeze. Normal breath sounds bilaterally.  ABDOMEN: Soft, nontender without mass or organomegaly. bowel sounds normoactive.  EXTREMITIES: no edema          LABS:                        8.1    5.8   )-----------( 205      ( 2017 06:07 )             24.6     11-23    140  |  103  |  9.0  ----------------------------<  105  3.3<L>   |  28.0  |  0.31<L>    Ca    9.2      2017 06:07  Mg     1.9     11-    TPro  6.8  /  Alb  3.9  /  TBili  0.3<L>  /  DBili  x   /  AST  29  /  ALT  21  /  AlkPhos  98  11-21    NIKKI BURGOSALFRED      PT/INR - ( 2017 16:49 )   PT: 11.6 sec;   INR: 1.05 ratio         PTT - ( 2017 16:49 )  PTT:34.1 sec  Urinalysis Basic - ( 2017 19:35 )    Color: Yellow / Appearance: Clear / S.010 / pH: x  Gluc: x / Ketone: Negative  / Bili: Negative / Urobili: Negative mg/dL   Blood: x / Protein: Negative mg/dL / Nitrite: Negative   Leuk Esterase: Negative / RBC: 3-5 /HPF / WBC Negative   Sq Epi: x / Non Sq Epi: Occasional / Bacteria: x        RADIOLOGY & ADDITIONAL STUDIES:    LABS:                        8.1    5.8   )-----------( 205      ( 2017 06:07 )             24.6     11-    140  |  103  |  9.0  ----------------------------<  105  3.3<L>   |  28.0  |  0.31<L>    Ca    9.2      2017 06:07  Mg     1.9     -    TPro  6.8  /  Alb  3.9  /  TBili  0.3<L>  /  DBili  x   /  AST  29  /  ALT  21  /  AlkPhos  98  11-    77y      PT/INR - ( 2017 16:49 )   PT: 11.6 sec;   INR: 1.05 ratio         PTT - ( 2017 16:49 )  PTT:34.1 sec  Urinalysis Basic - ( 2017 19:35 )    Color: Yellow / Appearance: Clear / S.010 / pH: x  Gluc: x / Ketone: Negative  / Bili: Negative / Urobili: Negative mg/dL   Blood: x / Protein: Negative mg/dL / Nitrite: Negative   Leuk Esterase: Negative / RBC: 3-5 /HPF / WBC Negative   Sq Epi: x / Non Sq Epi: Occasional / Bacteria: x        Assessment and Plan:  77 year old woman with ALS, paroxsymal atrial fibrillation on xarelto, Takotsubo cardiomyopathy (recovered EF), prior DVT, with recent PEG placement complicated by bleed and subsequent abdominal hematoma who presented to her outpatient cardiologist with tachycardia, lethargy and hypotension found to have hyperdynamic LVEF with THELMA, RV dilatation, suspected vegetation of AV, MV and TV as well as severe MR and TR concerning for endocarditis [place] [facility] - [facility], [facility]                                   [address] [place] [address]                                                      PHONE: [phone]                                                         FAX: [phone]  [email or web address]  ---------------------------------------------------------------------------------------------------------------------------------    FU for  Pt seen and examined. laying comfortably in bed    Overnight events/patient complaints:    No Known Allergies    MEDICATIONS  (STANDING):  brimonidine 0.2% Ophthalmic Solution 1 Drop(s) Both EYES three times a day  dextrose 5%. 1000 milliLiter(s) (50 mL/Hr) IV Continuous <Continuous>  dextrose 50% Injectable 12.5 Gram(s) IV Push once  dextrose 50% Injectable 25 Gram(s) IV Push once  dextrose 50% Injectable 25 Gram(s) IV Push once  dorzolamide 2% Ophthalmic Solution 1 Drop(s) Both EYES three times a day  dorzolamide 2% Ophthalmic Solution      lactobacillus acidophilus 1 Tablet(s) Oral daily  metoprolol succinate ER 12.5 milliGRAM(s) Oral daily  pantoprazole  Injectable 40 milliGRAM(s) IV Push daily  potassium chloride   Powder 40 milliEquivalent(s) Oral once  timolol 0.5% Solution 1 Drop(s) Both EYES two times a day    MEDICATIONS  (PRN):  dextrose Gel 1 Dose(s) Oral once PRN Blood Glucose LESS THAN 70 milliGRAM(s)/deciLiter  glucagon  Injectable 1 milliGRAM(s) IntraMuscular once PRN Glucose <70 milliGRAM(s)/deciLiter  lactulose Syrup 15 Gram(s) Oral once PRN if no bowel movement today with other constipation meds  polyethylene glycol 3350 17 Gram(s) Oral daily PRN Constipation  senna Syrup 10 milliLiter(s) Oral at bedtime PRN Constipation      Vital Signs Last 24 Hrs  T(C): 36.9 (2017 07:50), Max: 37 (2017 23:50)  T(F): 98.5 (2017 07:50), Max: 98.6 (2017 23:50)  HR: 113 (2017 07:50) (88 - 122)  BP: 125/77 (2017 07:50) (97/54 - 160/96)  BP(mean): --  RR: 18 (2017 07:50) (16 - 19)  SpO2: 99% (2017 07:50) (98% - 100%)  ICU Vital Signs Last 24 Hrs  I&O's Summary      PHYSICAL EXAM:  HEENT: no JVD  CARDIOVASCULAR: MR+  LUNGS: No rales, rhonchi or wheeze. Normal breath sounds bilaterally.  ABDOMEN: Soft, nontender without mass or organomegaly. bowel sounds normoactive.  EXTREMITIES: no edema          LABS:                        8.1    5.8   )-----------( 205      ( 2017 06:07 )             24.6     11-23    140  |  103  |  9.0  ----------------------------<  105  3.3<L>   |  28.0  |  0.31<L>    Ca    9.2      2017 06:07  Mg     1.9     11-23    TPro  6.8  /  Alb  3.9  /  TBili  0.3<L>  /  DBili  x   /  AST  29  /  ALT  21  /  AlkPhos  98  11-21    NIKKI BURGOSALFRED      PT/INR - ( 2017 16:49 )   PT: 11.6 sec;   INR: 1.05 ratio         PTT - ( 2017 16:49 )  PTT:34.1 sec  Urinalysis Basic - ( 2017 19:35 )    Color: Yellow / Appearance: Clear / S.010 / pH: x  Gluc: x / Ketone: Negative  / Bili: Negative / Urobili: Negative mg/dL   Blood: x / Protein: Negative mg/dL / Nitrite: Negative   Leuk Esterase: Negative / RBC: 3-5 /HPF / WBC Negative   Sq Epi: x / Non Sq Epi: Occasional / Bacteria: x        RADIOLOGY & ADDITIONAL STUDIES:    LABS:                        8.1    5.8   )-----------( 205      ( 2017 06:07 )             24.6     11-    140  |  103  |  9.0  ----------------------------<  105  3.3<L>   |  28.0  |  0.31<L>    Ca    9.2      2017 06:07  Mg     1.9     -    TPro  6.8  /  Alb  3.9  /  TBili  0.3<L>  /  DBili  x   /  AST  29  /  ALT  21  /  AlkPhos  98  11-    77y      PT/INR - ( 2017 16:49 )   PT: 11.6 sec;   INR: 1.05 ratio         PTT - ( 2017 16:49 )  PTT:34.1 sec  Urinalysis Basic - ( 2017 19:35 )    Color: Yellow / Appearance: Clear / S.010 / pH: x  Gluc: x / Ketone: Negative  / Bili: Negative / Urobili: Negative mg/dL   Blood: x / Protein: Negative mg/dL / Nitrite: Negative   Leuk Esterase: Negative / RBC: 3-5 /HPF / WBC Negative   Sq Epi: x / Non Sq Epi: Occasional / Bacteria: x        Assessment and Plan:  77 year old woman with ALS, paroxsymal atrial fibrillation on xarelto, Takotsubo cardiomyopathy (recovered EF), prior DVT, with recent PEG placement complicated by bleed and subsequent abdominal hematoma who presented to her outpatient cardiologist with tachycardia, lethargy and hypotension found to have hyperdynamic LVEF with THELMA, RV dilatation, suspected vegetation of AV, MV and TV as well as severe MR and TR concerning for endocarditis   Severe MR with ?endocarditis: no signs of acute decompensation. 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 ID on board, no signs of acute infection at present. Cultures pending. Will try to perform CT angio of heart once heart rate better controlled, will allow some evauluation of valve pathology.   AF: heart rate suboptimal, will add digoxin and increase metoprolol  ALS  D/w  at bedside [place] [facility] - [facility], [facility]                                   [address] [place] [address]                                                      PHONE: [phone]                                                         FAX: [phone]  [email or web address]  ---------------------------------------------------------------------------------------------------------------------------------    FU for  Pt seen and examined. laying comfortably in bed    Overnight events/patient complaints:    No Known Allergies    MEDICATIONS  (STANDING):  brimonidine 0.2% Ophthalmic Solution 1 Drop(s) Both EYES three times a day  dextrose 5%. 1000 milliLiter(s) (50 mL/Hr) IV Continuous <Continuous>  dextrose 50% Injectable 12.5 Gram(s) IV Push once  dextrose 50% Injectable 25 Gram(s) IV Push once  dextrose 50% Injectable 25 Gram(s) IV Push once  dorzolamide 2% Ophthalmic Solution 1 Drop(s) Both EYES three times a day  dorzolamide 2% Ophthalmic Solution      lactobacillus acidophilus 1 Tablet(s) Oral daily  metoprolol succinate ER 12.5 milliGRAM(s) Oral daily  pantoprazole  Injectable 40 milliGRAM(s) IV Push daily  potassium chloride   Powder 40 milliEquivalent(s) Oral once  timolol 0.5% Solution 1 Drop(s) Both EYES two times a day    MEDICATIONS  (PRN):  dextrose Gel 1 Dose(s) Oral once PRN Blood Glucose LESS THAN 70 milliGRAM(s)/deciLiter  glucagon  Injectable 1 milliGRAM(s) IntraMuscular once PRN Glucose <70 milliGRAM(s)/deciLiter  lactulose Syrup 15 Gram(s) Oral once PRN if no bowel movement today with other constipation meds  polyethylene glycol 3350 17 Gram(s) Oral daily PRN Constipation  senna Syrup 10 milliLiter(s) Oral at bedtime PRN Constipation      Vital Signs Last 24 Hrs  T(C): 36.9 (2017 07:50), Max: 37 (2017 23:50)  T(F): 98.5 (2017 07:50), Max: 98.6 (2017 23:50)  HR: 113 (2017 07:50) (88 - 122)  BP: 125/77 (2017 07:50) (97/54 - 160/96)  BP(mean): --  RR: 18 (2017 07:50) (16 - 19)  SpO2: 99% (2017 07:50) (98% - 100%)  ICU Vital Signs Last 24 Hrs  I&O's Summary      PHYSICAL EXAM:  HEENT: no JVD  CARDIOVASCULAR: MR+  LUNGS: No rales, rhonchi or wheeze. Normal breath sounds bilaterally.  ABDOMEN: Soft, nontender without mass or organomegaly. bowel sounds normoactive.  EXTREMITIES: no edema          LABS:                        8.1    5.8   )-----------( 205      ( 2017 06:07 )             24.6     11-23    140  |  103  |  9.0  ----------------------------<  105  3.3<L>   |  28.0  |  0.31<L>    Ca    9.2      2017 06:07  Mg     1.9     11-23    TPro  6.8  /  Alb  3.9  /  TBili  0.3<L>  /  DBili  x   /  AST  29  /  ALT  21  /  AlkPhos  98  11-21    NIKKI BURGOSALFRED      PT/INR - ( 2017 16:49 )   PT: 11.6 sec;   INR: 1.05 ratio         PTT - ( 2017 16:49 )  PTT:34.1 sec  Urinalysis Basic - ( 2017 19:35 )    Color: Yellow / Appearance: Clear / S.010 / pH: x  Gluc: x / Ketone: Negative  / Bili: Negative / Urobili: Negative mg/dL   Blood: x / Protein: Negative mg/dL / Nitrite: Negative   Leuk Esterase: Negative / RBC: 3-5 /HPF / WBC Negative   Sq Epi: x / Non Sq Epi: Occasional / Bacteria: x        RADIOLOGY & ADDITIONAL STUDIES:    LABS:                        8.1    5.8   )-----------( 205      ( 2017 06:07 )             24.6     11-    140  |  103  |  9.0  ----------------------------<  105  3.3<L>   |  28.0  |  0.31<L>    Ca    9.2      2017 06:07  Mg     1.9     -    TPro  6.8  /  Alb  3.9  /  TBili  0.3<L>  /  DBili  x   /  AST  29  /  ALT  21  /  AlkPhos  98  11-    77y      PT/INR - ( 2017 16:49 )   PT: 11.6 sec;   INR: 1.05 ratio         PTT - ( 2017 16:49 )  PTT:34.1 sec  Urinalysis Basic - ( 2017 19:35 )    Color: Yellow / Appearance: Clear / S.010 / pH: x  Gluc: x / Ketone: Negative  / Bili: Negative / Urobili: Negative mg/dL   Blood: x / Protein: Negative mg/dL / Nitrite: Negative   Leuk Esterase: Negative / RBC: 3-5 /HPF / WBC Negative   Sq Epi: x / Non Sq Epi: Occasional / Bacteria: x        Assessment and Plan:  77 year old woman with ALS, paroxsymal atrial fibrillation on xarelto, Takotsubo cardiomyopathy (recovered EF), prior DVT, with recent PEG placement complicated by bleed and subsequent abdominal hematoma who presented to her outpatient cardiologist with tachycardia, lethargy and hypotension found to have hyperdynamic LVEF with THELMA, RV dilatation, suspected vegetation of AV, MV and TV as well as severe MR and TR concerning for endocarditis     Severe MR with ?endocarditis: no signs of acute decompensation. 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 ID on board, no signs of acute infection at present. Cultures pending. Will try to perform CT angio of heart once heart rate better controlled, will allow some evauluation of valve pathology.   AF: heart rate suboptimal, will add digoxin and increase metoprolol  ALS  D/w  at bedside

## 2017-11-23 NOTE — PROGRESS NOTE ADULT - PROBLEM SELECTOR PLAN 7
-DVT-P: compression boots -c/w senna and miralax prn   -if needed will give lactulose   -d/w patients  and if not bm then will perform tap water

## 2017-11-23 NOTE — PROGRESS NOTE ADULT - PROBLEM SELECTOR PLAN 3
-Holding xarelto given current bleeding and likely pt is no longer a candidate for AC   -Episodes of RVR overnight and low dose IV metoprolol given   -restarted home medication metoprolol 12.5mg po qd with parameters   -cardio f/u noted and appreciated replaced 40meq x 1 dose  -f/u k+ in the am

## 2017-11-23 NOTE — PROGRESS NOTE ADULT - SUBJECTIVE AND OBJECTIVE BOX
Patient is a 77y old  Female who presents with a chief complaint of syncope, witnessed (2017 14:16)      HEALTH ISSUES - PROBLEM Dx:  Glaucoma: Glaucoma  Hematoma of abdominal wall, initial encounter: Hematoma of abdominal wall, initial encounter  PEG (percutaneous endoscopic gastrostomy) status: PEG (percutaneous endoscopic gastrostomy) status  Prophylactic measure: Prophylactic measure  ALS (amyotrophic lateral sclerosis): ALS (amyotrophic lateral sclerosis)  Paroxysmal atrial fibrillation: Paroxysmal atrial fibrillation  PEG tube malfunction: PEG tube malfunction  Blood loss anemia: Blood loss anemia      INTERVAL HPI/OVERNIGHT EVENTS:  Patient seen and examined at bedside. No acute events overnight. Patient's  at bedside and reports wife is feeling well. He is able to communicate with his wife when she mumbles sentences and she told him she would like to get up out of bed and that she feels constipated. D/w RN for the patient to be given her constipation meds and PT ordered. Unable to obtain proper ROS bc  is providing the history.       Vital Signs Last 24 Hrs  T(C): 36.9 (2017 07:50), Max: 37 (2017 23:50)  T(F): 98.5 (2017 07:50), Max: 98.6 (2017 23:50)  HR: 113 (2017 07:50) (88 - 122)  BP: 125/77 (2017 07:50) (97/54 - 160/96)  BP(mean): --  RR: 18 (2017 07:50) (16 - 19)  SpO2: 99% (2017 07:50) (98% - 100%)    CAPILLARY BLOOD GLUCOSE  POCT Blood Glucose.: 141 mg/dL (2017 21:30)      CONSTITUTIONAL: Well appearing, thin, awake, alert and in no apparent distress  CARDIAC: Normal rate, regular rhythm.  Heart sounds S1, S2.  No murmurs, rubs or gallops   RESPIRATORY: Breath sounds clear and equal bilaterally. No wheezes, rhales or rhonchi  GASTROENTEROLOGY: Soft, distended, slightly tender around peg site, peg in place C/D/I   EXTREMITIES: No edema or cyanosis, pt is slightly contracted UE/LE b/l   NEUROLOGICAL: Alert and awake, mumbles to have a conversation   SKIN: No rash, skin turgor wnl     MEDICATIONS  (STANDING):  brimonidine 0.2% Ophthalmic Solution 1 Drop(s) Both EYES three times a day  dextrose 5%. 1000 milliLiter(s) (50 mL/Hr) IV Continuous <Continuous>  dextrose 50% Injectable 12.5 Gram(s) IV Push once  dextrose 50% Injectable 25 Gram(s) IV Push once  dextrose 50% Injectable 25 Gram(s) IV Push once  dorzolamide 2% Ophthalmic Solution 1 Drop(s) Both EYES three times a day  dorzolamide 2% Ophthalmic Solution      lactobacillus acidophilus 1 Tablet(s) Oral daily  metoprolol succinate ER 12.5 milliGRAM(s) Oral daily  pantoprazole  Injectable 40 milliGRAM(s) IV Push daily  potassium chloride   Powder 40 milliEquivalent(s) Oral once  timolol 0.5% Solution 1 Drop(s) Both EYES two times a day    MEDICATIONS  (PRN):  dextrose Gel 1 Dose(s) Oral once PRN Blood Glucose LESS THAN 70 milliGRAM(s)/deciLiter  glucagon  Injectable 1 milliGRAM(s) IntraMuscular once PRN Glucose <70 milliGRAM(s)/deciLiter  lactulose Syrup 15 Gram(s) Oral once PRN if no bowel movement today with other constipation meds  polyethylene glycol 3350 17 Gram(s) Oral daily PRN Constipation  senna Syrup 10 milliLiter(s) Oral at bedtime PRN Constipation      LABS:                        8.1    5.8   )-----------( 205      ( 2017 06:07 )             24.6         140  |  103  |  9.0  ----------------------------<  105  3.3<L>   |  28.0  |  0.31<L>    Ca    9.2      2017 06:07  Mg     1.9         TPro  6.8  /  Alb  3.9  /  TBili  0.3<L>  /  DBili  x   /  AST  29  /  ALT  21  /  AlkPhos  98  11-    PT/INR - ( 2017 16:49 )   PT: 11.6 sec;   INR: 1.05 ratio         PTT - ( 2017 16:49 )  PTT:34.1 sec  Urinalysis Basic - ( 2017 19:35 )    Color: Yellow / Appearance: Clear / S.010 / pH: x  Gluc: x / Ketone: Negative  / Bili: Negative / Urobili: Negative mg/dL   Blood: x / Protein: Negative mg/dL / Nitrite: Negative   Leuk Esterase: Negative / RBC: 3-5 /HPF / WBC Negative   Sq Epi: x / Non Sq Epi: Occasional / Bacteria: x      LIVER FUNCTIONS - ( 2017 16:49 )  Alb: 3.9 g/dL / Pro: 6.8 g/dL / ALK PHOS: 98 U/L / ALT: 21 U/L / AST: 29 U/L / GGT: x             RADIOLOGY & ADDITIONAL TESTS:  No new imaging studies Patient is a 77y old  Female who presents with a chief complaint of sent by cardiologist for abnormal TTE findings and concern for severe valvular dysfunction and possible endocarditis and in the interim found to have acute blood loss anemia from an abdominal wall hematoma (2017 14:16)      HEALTH ISSUES - PROBLEM Dx:  Glaucoma: Glaucoma  Hematoma of abdominal wall, initial encounter: Hematoma of abdominal wall, initial encounter  PEG (percutaneous endoscopic gastrostomy) status: PEG (percutaneous endoscopic gastrostomy) status  Prophylactic measure: Prophylactic measure  ALS (amyotrophic lateral sclerosis): ALS (amyotrophic lateral sclerosis)  Paroxysmal atrial fibrillation: Paroxysmal atrial fibrillation  PEG tube malfunction: PEG tube malfunction  Blood loss anemia: Blood loss anemia      INTERVAL HPI/OVERNIGHT EVENTS:  Patient seen and examined at bedside. No acute events overnight. Patient's  at bedside and reports wife is feeling well. He is able to communicate with his wife when she mumbles sentences and she told him she would like to get up out of bed and that she feels constipated. D/w RN for the patient to be given her constipation meds and PT ordered. Unable to obtain proper ROS bc  is providing the history.       Vital Signs Last 24 Hrs  T(C): 36.9 (2017 07:50), Max: 37 (2017 23:50)  T(F): 98.5 (2017 07:50), Max: 98.6 (2017 23:50)  HR: 113 (2017 07:50) (88 - 122)  BP: 125/77 (2017 07:50) (97/54 - 160/96)  BP(mean): --  RR: 18 (2017 07:50) (16 - 19)  SpO2: 99% (2017 07:50) (98% - 100%)    CAPILLARY BLOOD GLUCOSE  POCT Blood Glucose.: 141 mg/dL (2017 21:30)      CONSTITUTIONAL: Well appearing, thin, awake, alert and in no apparent distress  CARDIAC: Tachycardic, Heart sounds S1, S2.  systolic murmur, no rubs or gallops   RESPIRATORY: Breath sounds clear and equal bilaterally. No wheezes, rhales or rhonchi  GASTROENTEROLOGY: Soft, distended, slightly tender around peg site, peg in place C/D/I   EXTREMITIES: No edema or cyanosis, pt is contracted UE/LE b/l   NEUROLOGICAL: Alert and awake, mumbles to have a conversation   SKIN: No rash, skin turgor wnl     MEDICATIONS  (STANDING):  brimonidine 0.2% Ophthalmic Solution 1 Drop(s) Both EYES three times a day  dextrose 5%. 1000 milliLiter(s) (50 mL/Hr) IV Continuous <Continuous>  dextrose 50% Injectable 12.5 Gram(s) IV Push once  dextrose 50% Injectable 25 Gram(s) IV Push once  dextrose 50% Injectable 25 Gram(s) IV Push once  dorzolamide 2% Ophthalmic Solution 1 Drop(s) Both EYES three times a day  dorzolamide 2% Ophthalmic Solution      lactobacillus acidophilus 1 Tablet(s) Oral daily  metoprolol succinate ER 12.5 milliGRAM(s) Oral daily  pantoprazole  Injectable 40 milliGRAM(s) IV Push daily  potassium chloride   Powder 40 milliEquivalent(s) Oral once  timolol 0.5% Solution 1 Drop(s) Both EYES two times a day    MEDICATIONS  (PRN):  dextrose Gel 1 Dose(s) Oral once PRN Blood Glucose LESS THAN 70 milliGRAM(s)/deciLiter  glucagon  Injectable 1 milliGRAM(s) IntraMuscular once PRN Glucose <70 milliGRAM(s)/deciLiter  lactulose Syrup 15 Gram(s) Oral once PRN if no bowel movement today with other constipation meds  polyethylene glycol 3350 17 Gram(s) Oral daily PRN Constipation  senna Syrup 10 milliLiter(s) Oral at bedtime PRN Constipation      LABS:                        8.1    5.8   )-----------( 205      ( 2017 06:07 )             24.6     11-23    140  |  103  |  9.0  ----------------------------<  105  3.3<L>   |  28.0  |  0.31<L>    Ca    9.2      2017 06:07  Mg     1.9     11-23    TPro  6.8  /  Alb  3.9  /  TBili  0.3<L>  /  DBili  x   /  AST  29  /  ALT  21  /  AlkPhos  98  11-21    PT/INR - ( 2017 16:49 )   PT: 11.6 sec;   INR: 1.05 ratio         PTT - ( 2017 16:49 )  PTT:34.1 sec  Urinalysis Basic - ( 2017 19:35 )    Color: Yellow / Appearance: Clear / S.010 / pH: x  Gluc: x / Ketone: Negative  / Bili: Negative / Urobili: Negative mg/dL   Blood: x / Protein: Negative mg/dL / Nitrite: Negative   Leuk Esterase: Negative / RBC: 3-5 /HPF / WBC Negative   Sq Epi: x / Non Sq Epi: Occasional / Bacteria: x      LIVER FUNCTIONS - ( 2017 16:49 )  Alb: 3.9 g/dL / Pro: 6.8 g/dL / ALK PHOS: 98 U/L / ALT: 21 U/L / AST: 29 U/L / GGT: x             RADIOLOGY & ADDITIONAL TESTS:  No new imaging studies

## 2017-11-23 NOTE — PROGRESS NOTE ADULT - ASSESSMENT
76 y/o F with H/O ALS s/p PEG tube placement with left ant abd wall fluid collection on CT possible hematoma  -  no sign of further bleeding.  -  H/H stable.  - no surgical intervention needed.  - Rest of care per primary team.   - General surgery will sign off please call for any further questions.  Patient seen and examined with attending who agrees on above plan.

## 2017-11-23 NOTE — PROGRESS NOTE ADULT - PROBLEM SELECTOR PLAN 2
-2/2 PEG site bleeding/ abdominal wall hematoma   -PEG placed last week   -H&H dropped from 13.3 to 7.3 on admission from prior labs   -s/p 1 unit PRBCs on 11/21/17 and thereafter h/h has remained stable with hgb ~8  -will c/w monitoring H/H and monitoring for bleeding looks as if it has subsided   -H&H 8.2 and stable  -given patient's severe valvular disease would be cautious with fluid resuscitation and transfusions.   -Moderate size left abdominal wall fluid collection. No evidence of CT dye extravasation in the fluid collection.  -GI consult noted and appreciated and noted that the peg is functional and that there is no contraindication to continue to use the peg tube   -surgery f/u noted and appreciated and no intervention at this time and will c/w serial abdominal exams -2/2  abdominal wall hematoma   -PEG recently placed last week   -H&H dropped from 13.3 to 7.3 on admission from prior labs   -s/p 1 unit PRBCs on 11/21/17 and thereafter h/h has remained stable with hgb ~8  -will c/w monitoring H/H and monitoring for bleeding looks as if it has subsided   -H&H 8.2 and stable at this number for over 24 hrs   -given patient's severe valvular disease would be cautious with fluid resuscitation and transfusions.   -Moderate size left abdominal wall fluid collection. No evidence of CT dye extravasation in the fluid collection.  -GI consult noted and appreciated and noted that the peg is functional and that there is no contraindication to continue to use the peg tube   -surgery f/u noted and appreciated and no intervention at this time and signed off the case

## 2017-11-23 NOTE — PROGRESS NOTE ADULT - ASSESSMENT
this 77 F with ALS here for blood loss anemia secondary to PEG with hematoma.  Also had outpatient TTE with suspected vegetation of AV, MV and TV as well as severe MR and TR, which are new per cardiology note.     NO clinical signs of endocarditis.

## 2017-11-24 ENCOUNTER — TRANSCRIPTION ENCOUNTER (OUTPATIENT)
Age: 77
End: 2017-11-24

## 2017-11-24 LAB
ALBUMIN SERPL ELPH-MCNC: 3.4 G/DL — SIGNIFICANT CHANGE UP (ref 3.3–5.2)
ALP SERPL-CCNC: 95 U/L — SIGNIFICANT CHANGE UP (ref 40–120)
ALT FLD-CCNC: 15 U/L — SIGNIFICANT CHANGE UP
ANION GAP SERPL CALC-SCNC: 10 MMOL/L — SIGNIFICANT CHANGE UP (ref 5–17)
AST SERPL-CCNC: 22 U/L — SIGNIFICANT CHANGE UP
BASOPHILS # BLD AUTO: 0 K/UL — SIGNIFICANT CHANGE UP (ref 0–0.2)
BASOPHILS NFR BLD AUTO: 0.2 % — SIGNIFICANT CHANGE UP (ref 0–2)
BILIRUB SERPL-MCNC: 0.4 MG/DL — SIGNIFICANT CHANGE UP (ref 0.4–2)
BUN SERPL-MCNC: 11 MG/DL — SIGNIFICANT CHANGE UP (ref 8–20)
CALCIUM SERPL-MCNC: 9.9 MG/DL — SIGNIFICANT CHANGE UP (ref 8.6–10.2)
CHLORIDE SERPL-SCNC: 99 MMOL/L — SIGNIFICANT CHANGE UP (ref 98–107)
CO2 SERPL-SCNC: 28 MMOL/L — SIGNIFICANT CHANGE UP (ref 22–29)
CREAT SERPL-MCNC: 0.21 MG/DL — LOW (ref 0.5–1.3)
EOSINOPHIL # BLD AUTO: 0.1 K/UL — SIGNIFICANT CHANGE UP (ref 0–0.5)
EOSINOPHIL NFR BLD AUTO: 1.3 % — SIGNIFICANT CHANGE UP (ref 0–6)
GLUCOSE SERPL-MCNC: 103 MG/DL — SIGNIFICANT CHANGE UP (ref 70–115)
HCT VFR BLD CALC: 27.1 % — LOW (ref 37–47)
HGB BLD-MCNC: 8.7 G/DL — LOW (ref 12–16)
LYMPHOCYTES # BLD AUTO: 1.5 K/UL — SIGNIFICANT CHANGE UP (ref 1–4.8)
LYMPHOCYTES # BLD AUTO: 28.1 % — SIGNIFICANT CHANGE UP (ref 20–55)
MCHC RBC-ENTMCNC: 29.7 PG — SIGNIFICANT CHANGE UP (ref 27–31)
MCHC RBC-ENTMCNC: 32.1 G/DL — SIGNIFICANT CHANGE UP (ref 32–36)
MCV RBC AUTO: 92.5 FL — SIGNIFICANT CHANGE UP (ref 81–99)
MONOCYTES # BLD AUTO: 0.5 K/UL — SIGNIFICANT CHANGE UP (ref 0–0.8)
MONOCYTES NFR BLD AUTO: 9.8 % — SIGNIFICANT CHANGE UP (ref 3–10)
NEUTROPHILS # BLD AUTO: 3.3 K/UL — SIGNIFICANT CHANGE UP (ref 1.8–8)
NEUTROPHILS NFR BLD AUTO: 60.2 % — SIGNIFICANT CHANGE UP (ref 37–73)
PLATELET # BLD AUTO: 233 K/UL — SIGNIFICANT CHANGE UP (ref 150–400)
POTASSIUM SERPL-MCNC: 3.6 MMOL/L — SIGNIFICANT CHANGE UP (ref 3.5–5.3)
POTASSIUM SERPL-SCNC: 3.6 MMOL/L — SIGNIFICANT CHANGE UP (ref 3.5–5.3)
PROT SERPL-MCNC: 6.2 G/DL — LOW (ref 6.6–8.7)
RBC # BLD: 2.93 M/UL — LOW (ref 4.4–5.2)
RBC # FLD: 16.7 % — HIGH (ref 11–15.6)
SODIUM SERPL-SCNC: 137 MMOL/L — SIGNIFICANT CHANGE UP (ref 135–145)
WBC # BLD: 5.4 K/UL — SIGNIFICANT CHANGE UP (ref 4.8–10.8)
WBC # FLD AUTO: 5.4 K/UL — SIGNIFICANT CHANGE UP (ref 4.8–10.8)

## 2017-11-24 PROCEDURE — 99233 SBSQ HOSP IP/OBS HIGH 50: CPT

## 2017-11-24 PROCEDURE — 99232 SBSQ HOSP IP/OBS MODERATE 35: CPT

## 2017-11-24 RX ORDER — POTASSIUM CHLORIDE 20 MEQ
40 PACKET (EA) ORAL ONCE
Qty: 0 | Refills: 0 | Status: COMPLETED | OUTPATIENT
Start: 2017-11-24 | End: 2017-11-24

## 2017-11-24 RX ORDER — METOPROLOL TARTRATE 50 MG
25 TABLET ORAL DAILY
Qty: 0 | Refills: 0 | Status: DISCONTINUED | OUTPATIENT
Start: 2017-11-24 | End: 2017-11-25

## 2017-11-24 RX ADMIN — Medication 12.5 MILLIGRAM(S): at 06:26

## 2017-11-24 RX ADMIN — Medication 1 DROP(S): at 18:32

## 2017-11-24 RX ADMIN — Medication 25 MILLIGRAM(S): at 13:13

## 2017-11-24 RX ADMIN — Medication 1 DROP(S): at 06:26

## 2017-11-24 RX ADMIN — BRIMONIDINE TARTRATE 1 DROP(S): 2 SOLUTION/ DROPS OPHTHALMIC at 22:03

## 2017-11-24 RX ADMIN — PANTOPRAZOLE SODIUM 40 MILLIGRAM(S): 20 TABLET, DELAYED RELEASE ORAL at 13:14

## 2017-11-24 RX ADMIN — Medication 0.25 MILLIGRAM(S): at 06:26

## 2017-11-24 RX ADMIN — DORZOLAMIDE HYDROCHLORIDE 1 DROP(S): 20 SOLUTION/ DROPS OPHTHALMIC at 06:26

## 2017-11-24 RX ADMIN — Medication 40 MILLIEQUIVALENT(S): at 18:32

## 2017-11-24 RX ADMIN — DORZOLAMIDE HYDROCHLORIDE 1 DROP(S): 20 SOLUTION/ DROPS OPHTHALMIC at 22:03

## 2017-11-24 RX ADMIN — Medication 1 TABLET(S): at 13:13

## 2017-11-24 NOTE — DISCHARGE NOTE ADULT - CARE PLAN
Principal Discharge DX:	Abnormal echocardiogram findings without diagnosis  Goal:	.  Instructions for follow-up, activity and diet:	Follow up with Dr. Deng for further management.  Secondary Diagnosis:	ALS (amyotrophic lateral sclerosis)  Instructions for follow-up, activity and diet:	Follow up with [facility] for further management.  Secondary Diagnosis:	Blood loss anemia  Instructions for follow-up, activity and diet:	Follow up with your primary care physician for further management. Rivaroxaban has been discontinued.  Secondary Diagnosis:	Glaucoma  Instructions for follow-up, activity and diet:	Resume use of home eye drops.

## 2017-11-24 NOTE — PROGRESS NOTE ADULT - SUBJECTIVE AND OBJECTIVE BOX
CC: Amyotropic Lateral Sclerosis with advancing disease, wheelchair bedbound. Peg placed for feeding and was bleeding from peg site.  Blood cx no growth so far. Echo done 11/21/17 showing no valvular vegetations. CLAYTON not feasible due to patient physical conditions.   HPI:  76 y/o female with sig. PMH of ALS who had PEG tube placement about 4 days ago at [facility] has been seen at [facility] ER twice for bleeding around her PEG tube and was discharged after bleeding was controlled. For past few days her HR has been high. On the day of ER visit pt. was seen at cardiologist dr. Hart office and she was noted to have HR in 130s to 140s . As per history she had an echo in the office and was noted to have a murmur and concern for vegetations. Pt. was sent to ER for r/o endocarditis. pt. did not have any fever. no chills. mild left lower abd. side discomfort. no blood per rectum reported. no n/v. no cough. pt's speech is very limited and not clear due to her advanced ALS. pt's  is at bedside and helping with history. In the ER pt. was noted to have Hb of 7.3 and on 11/17/17 it was 13.3.   Pt. has h/o afib and her Xeralto was stopped 2 days before her PEG procedure. As per  she sometimes uses cpap at night. (21 Nov 2017 23:17)    REVIEW OF SYSTEMS:    Patient denied fever, chills, abdominal pain, nausea, vomiting, cough, shortness of breath, chest pain or palpitations    Vital Signs Last 24 Hrs  T(C): 37.4 (24 Nov 2017 08:12), Max: 37.4 (23 Nov 2017 16:11)  T(F): 99.3 (24 Nov 2017 08:12), Max: 99.3 (23 Nov 2017 16:11)  HR: 83 (24 Nov 2017 08:12) (81 - 105)  BP: 118/73 (24 Nov 2017 08:12) (117/74 - 137/81)  BP(mean): --  RR: 18 (24 Nov 2017 08:12) (18 - 18)  SpO2: 100% (24 Nov 2017 08:12) (100% - 100%)I&O's Summary    PHYSICAL EXAM:  GENERAL: cachexia, musculoskeletal deformity   HEENT: PERRL, +EOMI, anicteric, no [place]  NECK: Supple, No JVD   CHEST/LUNG: CTA bilaterally; Normal effort  HEART: S1S2 Normal intensity, no murmurs, gallops or rubs noted  ABDOMEN: Soft, BS Normoactive. Suprapubic hematoma .   EXTREMITIES:  2+ radial and DP pulses noted, no clubbing, cyanosis, or edema noted. Bedbound, wheelchair bound   SKIN: No rashes or lesions noted  NEURO: A&O. Non verbal, limb and musculoskeletal deformities  PSYCH: Depressed mood and affect; insight/judgement appropriate  LABS:                        8.7    5.4   )-----------( 233      ( 24 Nov 2017 08:12 )             27.1     11-24    137  |  99  |  11.0  ----------------------------<  103  3.6   |  28.0  |  0.21<L>    Ca    9.9      24 Nov 2017 08:12  Mg     1.9     11-23    TPro  6.2<L>  /  Alb  3.4  /  TBili  0.4  /  DBili  x   /  AST  22  /  ALT  15  /  AlkPhos  95  11-24        RADIOLOGY & ADDITIONAL TESTS:    MEDICATIONS:  MEDICATIONS  (STANDING):  brimonidine 0.2% Ophthalmic Solution 1 Drop(s) Both EYES three times a day  dextrose 5%. 1000 milliLiter(s) (50 mL/Hr) IV Continuous <Continuous>  dextrose 50% Injectable 12.5 Gram(s) IV Push once  dextrose 50% Injectable 25 Gram(s) IV Push once  dextrose 50% Injectable 25 Gram(s) IV Push once  digoxin     Tablet 0.25 milliGRAM(s) Oral daily  dorzolamide 2% Ophthalmic Solution 1 Drop(s) Both EYES three times a day  dorzolamide 2% Ophthalmic Solution      lactobacillus acidophilus 1 Tablet(s) Oral daily  metoprolol succinate ER 25 milliGRAM(s) Oral daily  pantoprazole  Injectable 40 milliGRAM(s) IV Push daily  potassium chloride   Powder 40 milliEquivalent(s) Enteral Tube once  timolol 0.5% Solution 1 Drop(s) Both EYES two times a day    MEDICATIONS  (PRN):  dextrose Gel 1 Dose(s) Oral once PRN Blood Glucose LESS THAN 70 milliGRAM(s)/deciLiter  glucagon  Injectable 1 milliGRAM(s) IntraMuscular once PRN Glucose <70 milliGRAM(s)/deciLiter  lactulose Syrup 15 Gram(s) Oral once PRN if no bowel movement today with other constipation meds  polyethylene glycol 3350 17 Gram(s) Oral daily PRN Constipation  senna Syrup 10 milliLiter(s) Oral at bedtime PRN Constipation

## 2017-11-24 NOTE — PHYSICAL THERAPY INITIAL EVALUATION ADULT - GENERAL OBSERVATIONS, REHAB EVAL
Pt received seated in power W/C at bedside, Spouse and other family/friends at bedside. Pt without c/o pain, agreeable to PT evaluation and agreeable to have visitors in room during evaluation.

## 2017-11-24 NOTE — PROGRESS NOTE ADULT - ASSESSMENT
77 year old woman with ALS, paroxsymal atrial fibrillation on xarelto, Takotsubo cardiomyopathy (recovered EF), prior DVT, with recent PEG placement complicated by bleed and subsequent abdominal hematoma who presented to her outpatient cardiologist with tachycardia, lethargy and hypotension found to have hyperdynamic LVEF with THELMA, RV dilatation, suspected vegetation of AV, MV and TV as well as severe MR and TR concerning for endocarditis. Also found to have low H&H dropped from 13.3 to 7.3 this admission. S/p 1 unit prbc on 11/21/17 and some fluid with appropriate response. Cardiology consulted and continues to follow the pt. Bcx x 3 ordered to rule out endocarditis and ID consulted. Patient is a poor candidate for CLAYTON given that she is contracted. Pt may need long term antibiotics or life long abx for suppression dependent on repeat TTE and Bcx findings as per ID. Palliative care consulted for patients quality of life.      Problem/Plan - 1:  ·  Problem: R/O Endocarditis.  Plan: -Suspected vegetations noted on CLAYTON done as outpatient in cardiologists office  -Patient not a candidate for CLAYTON given her body habitus/contractures  -pt afebrile   -no leukocytosis   -systolic murmur on exam   -Cardiac CT to assess valvular pathology.  -For now serial blood cultures which are pending . Repeat TTE showing no vegegations.    -ID consult noted and appreciated and recommended against abx therapy at this time and repeat testing as above      Problem/Plan - 2:  ·  Problem: Blood loss anemia.  Plan: -2/2  abdominal wall hematoma   -PEG recently placed last week   -H&H dropped from 13.3 to 7.3 on admission from prior labs   -s/p 1 unit PRBCs on 11/21/17 and thereafter h/h has remained stable with hgb ~8  -will c/w monitoring H/H and monitoring for bleeding looks as if it has subsided   -H&H 8.7 and stable at this number for over 24 hrs   Moderate size left abdominal wall fluid collection. No evidence of CT dye extravasation in the fluid collection.  -GI consult noted and appreciated and noted that the peg is functional and that there is no contraindication to continue to use the peg tube   -surgery f/u noted and appreciated and no intervention at this time and signed off the case.      Problem/Plan - 3:  ·  Problem: Hypokalemia.  Plan: replaced 40meq x 1 dose  -f/u k+ in the am.      Problem/Plan - 4:  ·  Problem: Paroxysmal atrial fibrillation.  Plan: -Holding xarelto given current bleeding and likely pt is no longer a candidate for AC  -c/w metoprolol increased dose 12.5mg po tid and digoxin added as per cardiology      Problem/Plan - 5:  ·  Problem: ALS (amyotrophic lateral sclerosis).  Plan: Progressive   -Continue current management with supportive care   -Patient able to take PO and eats pureed/thin liquids diet at home   -c/w Aspiration precautions  -Palliative care consult to continue goals of care discussion. The  is the primary care taker, patient herself voices her opinion to him. He is very involved in her care and treatment plan. Ongoing discussions with the  in regards to her quality of life.  -Advanced directives DNR/DNI.     Problem/Plan - 6:  Problem: Glaucoma. Plan: -c/w timolol/ dorzalamide and alphagan drops.     Problem/Plan - 7:  ·  Problem: Constipation.  Plan: -c/w senna and miralax prn   -if needed will give lactulose   -d/w patients  and if not bm then will perform tap water.

## 2017-11-24 NOTE — PHYSICAL THERAPY INITIAL EVALUATION ADULT - ADDITIONAL COMMENTS
Pt lives in a private home with her spouse. no steps to navigate as pt has a chair lift. Pt was receiving PT and OT at home PTA, was only able to ambulate a few steps with Assist of PT only and spouse assisted with transfers to and from w/c. As per pt's spouse pt had an aide in the past but insurance ran out and pt has not had a HHA or CNA recently. Pt owns chair lift, power w/c, r/w, commode, and shower chair.

## 2017-11-24 NOTE — DISCHARGE NOTE ADULT - CARE PROVIDER_API CALL
Jos Deng (MD), Cardiovascular Disease; Internal Medicine  [address] [place] [address]  Phone: [phone]  Fax: [phone]    Patrick Brown (), Critical Care Medicine; Internal Medicine; Pulmonary Disease  [address] [place] [address]  Phone: [phone]  Fax: [phone]

## 2017-11-24 NOTE — DISCHARGE NOTE ADULT - PLAN OF CARE
. Follow up with Dr. Deng for further management. Follow up with [facility] for further management. Follow up with your primary care physician for further management. Rivaroxaban has been discontinued. Resume use of home eye drops.

## 2017-11-24 NOTE — PROGRESS NOTE ADULT - SUBJECTIVE AND OBJECTIVE BOX
[facility]  INFECTIOUS DISEASES AND INTERNAL MEDICINE OF [facility]  =======================================================  Elie Veronica MD  Diplomates American Board of Internal Medicine and Infectious Diseases  =======================================================    NIKKI RODRIGUEZ [id number]  chief complaint: PEG site hematoma    patient seen and examined in follow up.  Chart and labs reviewed.   remains off antibiotics .   ALL C/S NEG  ECHO NO VEGETATION  NOTED    =======================================================  Allergies:  No Known Allergies      =======================================================  Medications:  NO ANTIBIOTICS       =======================================================     REVIEW OF SYSTEMS:  limited due to medical illness.   =======================================================    Physical Exam:    Vital Signs Last 24 Hrs  T(C): 36.9 (2017 07:50), Max: 37 (2017 23:50)  T(F): 98.5 (2017 07:50), Max: 98.6 (2017 23:50)  HR: 113 (2017 07:50) (88 - 122)  BP: 125/77 (2017 07:50) (97/54 - 160/96)  RR: 18 (2017 07:50) (16 - 19)  SpO2: 99% (2017 07:50) (98% - 100%)  GEN: NAD, pleasant; thin  HEENT: normocephalic and atraumatic.  NECK: Contracted, and turned to right side. No carotid bruits.  No lymphadenopathy or thyromegaly.  LUNGS: Clear to auscultation.  HEART: Regular rate and rhythm, positive murmur.  ABDOMEN: Soft, nontender, and nondistended.  Positive bowel sounds.   PEG in left lower abd; hematoma at site; ecchymoses down to left inguinal fold. NO CELLULITIS  : No CVA tenderness  SKIN: No ulceration or induration present.    =======================================================    Labs:      140  |  103  |  9.0  ----------------------------<  105  3.3<L>   |  28.0  |  0.31<L>    Ca    9.2      2017 06:07  Mg     1.9         TPro  6.8  /  Alb  3.9  /  TBili  0.3<L>  /  DBili  x   /  AST  29  /  ALT  21  /  AlkPhos  98  -                          8.1    5.8   )-----------( 205      ( 2017 06:07 )             24.6       PT/INR - ( 2017 16:49 )   PT: 11.6 sec;   INR: 1.05 ratio         PTT - ( 2017 16:49 )  PTT:34.1 sec  Urinalysis Basic - ( 2017 19:35 )    Color: Yellow / Appearance: Clear / S.010 / pH: x  Gluc: x / Ketone: Negative  / Bili: Negative / Urobili: Negative mg/dL   Blood: x / Protein: Negative mg/dL / Nitrite: Negative   Leuk Esterase: Negative / RBC: 3-5 /HPF / WBC Negative   Sq Epi: x / Non Sq Epi: Occasional / Bacteria: x      LIVER FUNCTIONS - ( 2017 16:49 )  Alb: 3.9 g/dL / Pro: 6.8 g/dL / ALK PHOS: 98 U/L / ALT: 21 U/L / AST: 29 U/L / GGT: x              POCT Blood Glucose.: 141 mg/dL (2017 21:30)    ABG - ( 2017 17:08 )  pH: 7.42  /  pCO2: 46    /  pO2: 78    / HCO3: 29    / Base Excess: 4.8   /  SaO2: 97             RECENT CULTURES:  - @ 19:34 .Urine Clean Catch (Midstream)     <10,000 CFU/ml Gram Negative Rods

## 2017-11-24 NOTE — PROGRESS NOTE ADULT - SUBJECTIVE AND OBJECTIVE BOX
[facility] - [facility], [facility]                                   [address] [place] [address]                                                      PHONE: [phone]                                                         FAX: [phone]  [email or web address]  ---------------------------------------------------------------------------------------------------------------------------------    Overnight events/patient complaints:      PAST MEDICAL & SURGICAL HISTORY:  BiPAP (biphasic positive airway pressure) dependence: at night pt has personal bipap  Kidney stones  Glaucoma  ALS (amyotrophic lateral sclerosis): no oxygen as per pt.  Breast tumor: removed L side  Bilateral cataracts: Eye lids surgery  Hunter surgery  H/O:       No Known Allergies    MEDICATIONS  (STANDING):  brimonidine 0.2% Ophthalmic Solution 1 Drop(s) Both EYES three times a day  dextrose 5%. 1000 milliLiter(s) (50 mL/Hr) IV Continuous <Continuous>  dextrose 50% Injectable 12.5 Gram(s) IV Push once  dextrose 50% Injectable 25 Gram(s) IV Push once  dextrose 50% Injectable 25 Gram(s) IV Push once  digoxin     Tablet 0.25 milliGRAM(s) Oral daily  dorzolamide 2% Ophthalmic Solution 1 Drop(s) Both EYES three times a day  dorzolamide 2% Ophthalmic Solution      lactobacillus acidophilus 1 Tablet(s) Oral daily  metoprolol succinate ER 12.5 milliGRAM(s) Oral three times a day  pantoprazole  Injectable 40 milliGRAM(s) IV Push daily  potassium chloride   Powder 40 milliEquivalent(s) Enteral Tube once  timolol 0.5% Solution 1 Drop(s) Both EYES two times a day    MEDICATIONS  (PRN):  dextrose Gel 1 Dose(s) Oral once PRN Blood Glucose LESS THAN 70 milliGRAM(s)/deciLiter  glucagon  Injectable 1 milliGRAM(s) IntraMuscular once PRN Glucose <70 milliGRAM(s)/deciLiter  lactulose Syrup 15 Gram(s) Oral once PRN if no bowel movement today with other constipation meds  polyethylene glycol 3350 17 Gram(s) Oral daily PRN Constipation  senna Syrup 10 milliLiter(s) Oral at bedtime PRN Constipation      Vital Signs Last 24 Hrs  T(C): 37.4 (2017 08:12), Max: 37.4 (2017 16:11)  T(F): 99.3 (2017 08:12), Max: 99.3 (2017 16:11)  HR: 83 (2017 08:12) (81 - 105)  BP: 118/73 (2017 08:12) (117/74 - 137/81)  BP(mean): --  RR: 18 (2017 08:12) (18 - 18)  SpO2: 100% (2017 08:12) (100% - 100%)  ICU Vital Signs Last 24 Hrs  NIKKI JENNIFER  I&O's Detail    I&O's Summary    Drug Dosing Weight  NIKKI RODRIGUEZ      PHYSICAL EXAM:  General: contracted   HEENT: Head; normocephalic, atraumatic.  Eyes: Pupils reactive, cornea wnl.  Neck: Supple, no nodes adenopathy, no NVD or carotid bruit or thyromegaly.  CARDIOVASCULAR: Normal S1 and S2, 3/6 pansystolic murmur with +lift   LUNGS: No rales, rhonchi or wheeze. Normal breath sounds bilaterally.  ABDOMEN: Soft, nontender without mass or organomegaly. bowel sounds normoactive.  EXTREMITIES: No clubbing, cyanosis or edema. Distal pulses wnl.   SKIN: warm and dry with normal turgor.  NEURO: contracted         LABS:                        8.7    5.4   )-----------( 233      ( 2017 08:12 )             27.1     11-    137  |  99  |  11.0  ----------------------------<  103  3.6   |  28.0  |  0.21<L>    Ca    9.9      2017 08:12  Mg     1.9         TPro  6.2<L>  /  Alb  3.4  /  TBili  0.4  /  DBili  x   /  AST  22  /  ALT  15  /  AlkPhos  95        RADIOLOGY & ADDITIONAL STUDIES:    INTERPRETATION OF TELEMETRY (personally reviewed): sinus tachycardia      ASSESSMENT AND PLAN:  In summary, NIKKI RODRIGUEZ is an 77 year old woman with ALS, paroxsymal atrial fibrillation on xarelto, Takotsubo cardiomyopathy (recovered EF), prior DVT, with recent PEG placement complicated by bleed and subsequent abdominal hematoma who presented to her outpatient cardiologist with tachycardia, lethargy and hypotension found to have hyperdynamic LVEF with THELMA, RV dilatation, suspected vegetation of AV, MV and TV as well as severe MR and TR concerning for endocarditis     Severe MR/TR with ?endocarditis: no signs of acute decompensation. 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  - will f/u cultures  - pending cardiac CT to assess valvular pathology  - given tachycardia, increase metoprolol to 25mg   - cultures with no growth to date  - ID follow-up     Paroxsymal atrial fibrillation  - given acute on chronic anemia and abdominal hematoma; hold anticoagulation   - continue digoxin     Thank you for allowing [facility] to participate in the care of this patient.  Please feel free to call with any questions or concerns.

## 2017-11-24 NOTE — PHYSICAL THERAPY INITIAL EVALUATION ADULT - PERTINENT HX OF CURRENT PROBLEM, REHAB EVAL
76 y/o female with sig. PMH of ALS who had PEG tube placement about 4 days ago at [facility] has been seen at [facility] ER twice for bleeding around her PEG tube and was discharged after bleeding was controlled. Pt noted to have tachycardia in MD office, came to ED and admitted for symptomatic anemia, likely source is acute blood loss related to her PEG tube and left abd. wall hematoma.

## 2017-11-24 NOTE — DISCHARGE NOTE ADULT - PATIENT PORTAL LINK FT
“You can access the [facility] Patient Portal, offered by [facility], by registering with the following website: [email or web address]”

## 2017-11-24 NOTE — DISCHARGE NOTE ADULT - MEDICATION SUMMARY - MEDICATIONS TO TAKE
I will START or STAY ON the medications listed below when I get home from the hospital:    aspirin 81 mg oral delayed release tablet  -- 1 tab(s) by mouth once a day  -- Indication: For Afib    digoxin 250 mcg (0.25 mg) oral tablet  -- 1 tab(s) by mouth once a day  -- Indication: For Afib    atorvastatin 10 mg oral tablet  -- 1 tab(s) by mouth once a day (at bedtime)  -- Indication: For Hyperlipidemia    metoprolol succinate 25 mg oral tablet, extended release  -- 0.5 tab(s) by mouth once a day  -- Indication: For Afib    baclofen  --  by mouth , As Needed  -- Indication: For ALS (amyotrophic lateral sclerosis)    Alphagan 0.2% ophthalmic solution  -- 1 drop(s) to each affected eye 3 times a day  -- Indication: For Glaucoma    Azopt 1% ophthalmic suspension  -- 1 drop(s) to each affected eye 3 times a day  -- Indication: For Glaucoma    Betimol 0.5% ophthalmic solution  -- 1 drop(s) to each affected eye 2 times a day  -- Indication: For Glaucoma    Restasis 0.05% ophthalmic emulsion  -- 1 drop(s) to each affected eye every 12 hours  -- Indication: For Glaucoma

## 2017-11-24 NOTE — DISCHARGE NOTE ADULT - MEDICATION SUMMARY - MEDICATIONS TO STOP TAKING
I will STOP taking the medications listed below when I get home from the hospital:    amiodarone 200 mg oral tablet  -- 2 tab(s) by mouth 2 times a day after 4 days 200mg daily    amiodarone 200 mg oral tablet  -- 1 tab(s) by mouth once a day start from 12/07/2016  -- Avoid grapefruit and grapefruit juice while taking this medication.  Avoid prolonged or excessive exposure to direct and/or artificial sunlight while taking this medication.  Do not take this drug if you are pregnant.  It is very important that you take or use this exactly as directed.  Do not skip doses or discontinue unless directed by your doctor.  May cause drowsiness or dizziness.    rivaroxaban 15 mg oral tablet  -- 1 tab(s) by mouth twice  -- Check with your doctor before becoming pregnant.  It is very important that you take or use this exactly as directed.  Do not skip doses or discontinue unless directed by your doctor.  Obtain medical advice before taking any non-prescription drugs as some may affect the action of this medication.  Take with food.

## 2017-11-24 NOTE — DISCHARGE NOTE ADULT - HOSPITAL COURSE
77F with recent placement of a gastrostomy tube at [facility] presented with tachycardia at the cardiologist office. Echocardiogram at the time noted a hyperdynamic left ventricle, severe MR and TR, and possible vegetation on the aortic valve. She was noted to have been previously evaluated for bleeding from the gastrostomy tube site. The patient was noted to be on rivaroxaban for anticoagulation which was held a few days prior to the placement of the gastrostomy tube. On presentation. CT of the abdomen noted mild bibasal atelectasis, enlarged heart, PEG tube balloon within the stomach, moderate fluid adjacent to the PEG tube in the adjacent abdominal wall, markedly distended urinary bladder, large amount of fecal material throughout large bowel, no ileus or obstruction. The patient required transfusion of packed red blood cells for acute blood loss anemia. The patient was seen by Surgery in consultation for the abdominal hematoma and continued on conservative management. The patient was seen by Cardiology in consultation and the tachycardia was thought to be secondary to hypovolemia. The patient was seen by Infectious Disease in consultation for the gastrostomy site hematoma and thought not to have an underlying infection. The patient was seen by Gastroenterology in consultation and the gastrostomy tube was thought to be in place and functional without further need for intervention. Blood cultures were without growth. The patient remained asymptomatic and was seen by Cardiology. She was thought to be stable for further management on an outpatient basis.    35 minutes total time

## 2017-11-24 NOTE — PHYSICAL THERAPY INITIAL EVALUATION ADULT - ORIENTATION, REHAB EVAL
PT unable to state name/ but can verbalize yes/no when asked her ID info./oriented to person, place, time and situation

## 2017-11-24 NOTE — DISCHARGE NOTE ADULT - MEDICATION SUMMARY - MEDICATIONS TO CHANGE
I will SWITCH the dose or number of times a day I take the medications listed below when I get home from the hospital:    metoprolol succinate 25 mg oral tablet, extended release  -- 1 tab(s) by mouth once a day hold if SBP is less than 90, HR less than 50

## 2017-11-24 NOTE — PHYSICAL THERAPY INITIAL EVALUATION ADULT - GAIT DEVIATIONS NOTED, PT EVAL
PT assisting with lateral weight shifts to assist foot clearance bilaterally/decreased weight-shifting ability/decreased cyndee/decreased step length

## 2017-11-24 NOTE — DISCHARGE NOTE ADULT - CARE PROVIDERS DIRECT ADDRESSES
,DirectAddress_Unknown,[email or web address]

## 2017-11-24 NOTE — PHYSICAL THERAPY INITIAL EVALUATION ADULT - RANGE OF MOTION EXAMINATION, REHAB EVAL
BUE severly limited to right shoulder/elbow. Pt uses her phone to communicate by typing with index finger/bilateral lower extremity ROM was WFL (within functional limits)

## 2017-11-25 VITALS
RESPIRATION RATE: 18 BRPM | SYSTOLIC BLOOD PRESSURE: 93 MMHG | OXYGEN SATURATION: 95 % | DIASTOLIC BLOOD PRESSURE: 64 MMHG | HEART RATE: 90 BPM

## 2017-11-25 LAB
ANION GAP SERPL CALC-SCNC: 10 MMOL/L — SIGNIFICANT CHANGE UP (ref 5–17)
BUN SERPL-MCNC: 11 MG/DL — SIGNIFICANT CHANGE UP (ref 8–20)
CALCIUM SERPL-MCNC: 9.7 MG/DL — SIGNIFICANT CHANGE UP (ref 8.6–10.2)
CHLORIDE SERPL-SCNC: 102 MMOL/L — SIGNIFICANT CHANGE UP (ref 98–107)
CO2 SERPL-SCNC: 29 MMOL/L — SIGNIFICANT CHANGE UP (ref 22–29)
CREAT SERPL-MCNC: 0.25 MG/DL — LOW (ref 0.5–1.3)
GLUCOSE SERPL-MCNC: 96 MG/DL — SIGNIFICANT CHANGE UP (ref 70–115)
HCT VFR BLD CALC: 26.8 % — LOW (ref 37–47)
HGB BLD-MCNC: 8.4 G/DL — LOW (ref 12–16)
MCHC RBC-ENTMCNC: 29.4 PG — SIGNIFICANT CHANGE UP (ref 27–31)
MCHC RBC-ENTMCNC: 31.3 G/DL — LOW (ref 32–36)
MCV RBC AUTO: 93.7 FL — SIGNIFICANT CHANGE UP (ref 81–99)
PLATELET # BLD AUTO: 257 K/UL — SIGNIFICANT CHANGE UP (ref 150–400)
POTASSIUM SERPL-MCNC: 4.2 MMOL/L — SIGNIFICANT CHANGE UP (ref 3.5–5.3)
POTASSIUM SERPL-SCNC: 4.2 MMOL/L — SIGNIFICANT CHANGE UP (ref 3.5–5.3)
RBC # BLD: 2.86 M/UL — LOW (ref 4.4–5.2)
RBC # FLD: 16.2 % — HIGH (ref 11–15.6)
SODIUM SERPL-SCNC: 141 MMOL/L — SIGNIFICANT CHANGE UP (ref 135–145)
WBC # BLD: 5.7 K/UL — SIGNIFICANT CHANGE UP (ref 4.8–10.8)
WBC # FLD AUTO: 5.7 K/UL — SIGNIFICANT CHANGE UP (ref 4.8–10.8)

## 2017-11-25 PROCEDURE — 71045 X-RAY EXAM CHEST 1 VIEW: CPT

## 2017-11-25 PROCEDURE — 74177 CT ABD & PELVIS W/CONTRAST: CPT

## 2017-11-25 PROCEDURE — 84295 ASSAY OF SERUM SODIUM: CPT

## 2017-11-25 PROCEDURE — 85610 PROTHROMBIN TIME: CPT

## 2017-11-25 PROCEDURE — 86850 RBC ANTIBODY SCREEN: CPT

## 2017-11-25 PROCEDURE — 97163 PT EVAL HIGH COMPLEX 45 MIN: CPT

## 2017-11-25 PROCEDURE — 80048 BASIC METABOLIC PNL TOTAL CA: CPT

## 2017-11-25 PROCEDURE — 82803 BLOOD GASES ANY COMBINATION: CPT

## 2017-11-25 PROCEDURE — 82330 ASSAY OF CALCIUM: CPT

## 2017-11-25 PROCEDURE — 85027 COMPLETE CBC AUTOMATED: CPT

## 2017-11-25 PROCEDURE — 87040 BLOOD CULTURE FOR BACTERIA: CPT

## 2017-11-25 PROCEDURE — 80053 COMPREHEN METABOLIC PANEL: CPT

## 2017-11-25 PROCEDURE — 82435 ASSAY OF BLOOD CHLORIDE: CPT

## 2017-11-25 PROCEDURE — 99239 HOSP IP/OBS DSCHRG MGMT >30: CPT

## 2017-11-25 PROCEDURE — 99285 EMERGENCY DEPT VISIT HI MDM: CPT | Mod: 25

## 2017-11-25 PROCEDURE — 93306 TTE W/DOPPLER COMPLETE: CPT

## 2017-11-25 PROCEDURE — 86900 BLOOD TYPING SEROLOGIC ABO: CPT

## 2017-11-25 PROCEDURE — 84132 ASSAY OF SERUM POTASSIUM: CPT

## 2017-11-25 PROCEDURE — 82947 ASSAY GLUCOSE BLOOD QUANT: CPT

## 2017-11-25 PROCEDURE — 81001 URINALYSIS AUTO W/SCOPE: CPT

## 2017-11-25 PROCEDURE — 86920 COMPATIBILITY TEST SPIN: CPT

## 2017-11-25 PROCEDURE — 93005 ELECTROCARDIOGRAM TRACING: CPT

## 2017-11-25 PROCEDURE — 96374 THER/PROPH/DIAG INJ IV PUSH: CPT | Mod: XU

## 2017-11-25 PROCEDURE — 86901 BLOOD TYPING SEROLOGIC RH(D): CPT

## 2017-11-25 PROCEDURE — 96375 TX/PRO/DX INJ NEW DRUG ADDON: CPT

## 2017-11-25 PROCEDURE — 99283 EMERGENCY DEPT VISIT LOW MDM: CPT

## 2017-11-25 PROCEDURE — 85730 THROMBOPLASTIN TIME PARTIAL: CPT

## 2017-11-25 PROCEDURE — 82272 OCCULT BLD FECES 1-3 TESTS: CPT

## 2017-11-25 PROCEDURE — 85014 HEMATOCRIT: CPT

## 2017-11-25 PROCEDURE — 85018 HEMOGLOBIN: CPT

## 2017-11-25 PROCEDURE — 94760 N-INVAS EAR/PLS OXIMETRY 1: CPT

## 2017-11-25 PROCEDURE — 83735 ASSAY OF MAGNESIUM: CPT

## 2017-11-25 PROCEDURE — 83605 ASSAY OF LACTIC ACID: CPT

## 2017-11-25 PROCEDURE — 82962 GLUCOSE BLOOD TEST: CPT

## 2017-11-25 PROCEDURE — P9016: CPT

## 2017-11-25 PROCEDURE — 87086 URINE CULTURE/COLONY COUNT: CPT

## 2017-11-25 PROCEDURE — 36415 COLL VENOUS BLD VENIPUNCTURE: CPT

## 2017-11-25 RX ORDER — DIGOXIN 250 MCG
1 TABLET ORAL
Qty: 30 | Refills: 0 | OUTPATIENT
Start: 2017-11-25 | End: 2017-12-25

## 2017-11-25 RX ORDER — METOPROLOL TARTRATE 50 MG
0.5 TABLET ORAL
Qty: 15 | Refills: 0 | OUTPATIENT
Start: 2017-11-25 | End: 2017-12-25

## 2017-11-25 RX ADMIN — PANTOPRAZOLE SODIUM 40 MILLIGRAM(S): 20 TABLET, DELAYED RELEASE ORAL at 13:11

## 2017-11-25 RX ADMIN — Medication 1 TABLET(S): at 13:11

## 2017-11-25 RX ADMIN — Medication 0.25 MILLIGRAM(S): at 06:24

## 2017-11-25 RX ADMIN — Medication 25 MILLIGRAM(S): at 06:24

## 2017-11-25 RX ADMIN — Medication 1 DROP(S): at 16:21

## 2017-11-25 RX ADMIN — BRIMONIDINE TARTRATE 1 DROP(S): 2 SOLUTION/ DROPS OPHTHALMIC at 06:24

## 2017-11-25 NOTE — PROGRESS NOTE ADULT - SUBJECTIVE AND OBJECTIVE BOX
NIKKI RODRIGUEZ  ----------------------------------------  The patient was seen and evaluated for anemia.  The patient is in no acute distress.  Offers no complaints. The patient's family is at bedside and reported that she had tolerated eating lunch well.    Vital Signs Last 24 Hrs  T(C): 36.4 (25 Nov 2017 07:45), Max: 37.4 (24 Nov 2017 15:59)  T(F): 97.6 (25 Nov 2017 07:45), Max: 99.3 (24 Nov 2017 15:59)  HR: 90 (25 Nov 2017 11:48) (76 - 90)  BP: 93/64 (25 Nov 2017 11:48) (92/63 - 110/58)  BP(mean): --  RR: 18 (25 Nov 2017 11:48) (18 - 18)  SpO2: 95% (25 Nov 2017 11:48) (95% - 98%)    PHYSICAL EXAMINATION:  ----------------------------------------  General appearance: NAD, Awake, Alert  HEENT: NCAT, Conjunctiva clear, EOMI, Pupils reactive  Neck: Supple, No JVD, No tenderness  Lungs: Clear to auscultation, Breath sound equal bilaterally, No wheezes, No rales  Cardiovascular: S1S2, Regular rhythm  Abdomen: Soft, Nontender, Nondistended, No guarding/rebound, Positive bowel sounds  Extremities: No cyanosis, No calf tenderness, Lower extremity edema  Neuro: No tremors    LABORATORY STUDIES:  ----------------------------------------             8.4    5.7   )-----------( 257      ( 25 Nov 2017 07:34 )             26.8     11-25    141  |  102  |  11.0  ----------------------------<  96  4.2   |  29.0  |  0.25<L>    Ca    9.7      25 Nov 2017 07:33    TPro  6.2<L>  /  Alb  3.4  /  TBili  0.4  /  DBili  x   /  AST  22  /  ALT  15  /  AlkPhos  95  11-24    LIVER FUNCTIONS - ( 24 Nov 2017 08:12 )  Alb: 3.4 g/dL / Pro: 6.2 g/dL / ALK PHOS: 95 U/L / ALT: 15 U/L / AST: 22 U/L / GGT: x           Culture - Blood (collected 23 Nov 2017 06:08)  Source: .Blood Blood-Heart  Preliminary Report (25 Nov 2017 07:01):    No growth at 48 hours    MEDICATIONS  (STANDING):  brimonidine 0.2% Ophthalmic Solution 1 Drop(s) Both EYES three times a day  dextrose 5%. 1000 milliLiter(s) (50 mL/Hr) IV Continuous <Continuous>  dextrose 50% Injectable 12.5 Gram(s) IV Push once  dextrose 50% Injectable 25 Gram(s) IV Push once  dextrose 50% Injectable 25 Gram(s) IV Push once  digoxin     Tablet 0.25 milliGRAM(s) Oral daily  dorzolamide 2% Ophthalmic Solution 1 Drop(s) Both EYES three times a day  dorzolamide 2% Ophthalmic Solution      lactobacillus acidophilus 1 Tablet(s) Oral daily  metoprolol succinate ER 25 milliGRAM(s) Oral daily  pantoprazole  Injectable 40 milliGRAM(s) IV Push daily  timolol 0.5% Solution 1 Drop(s) Both EYES two times a day    MEDICATIONS  (PRN):  dextrose Gel 1 Dose(s) Oral once PRN Blood Glucose LESS THAN 70 milliGRAM(s)/deciLiter  glucagon  Injectable 1 milliGRAM(s) IntraMuscular once PRN Glucose <70 milliGRAM(s)/deciLiter  lactulose Syrup 15 Gram(s) Oral once PRN if no bowel movement today with other constipation meds  polyethylene glycol 3350 17 Gram(s) Oral daily PRN Constipation  senna Syrup 10 milliLiter(s) Oral at bedtime PRN Constipation      ASSESSMENT / PLAN:  ----------------------------------------  Acute blood loss anemia - Status post transfusion of packed red blood cells. Blood count has remained stable.    Atrial fibrillation - Discussed with Cardiology. Medications adjusted.    ALS - Gastrostomy tube in place. Follow up with [facility]. Warfarin discontinued for now as the patient's  reported that it was held since prior to the gastrostomy tube and that the patient had a repeat ultrasound by her pulmonologist which did not note any DVT.    Hypokalemia - Improved with supplementation.    Echocardiogram was repeated and was without significant changes. The case was discussed with Cardiology who did not recommend further testing. No antibiotics were recommended by Infectious Disease in consultation.    This was discussed with the patient's family at the bedside.

## 2017-11-25 NOTE — PROGRESS NOTE ADULT - SUBJECTIVE AND OBJECTIVE BOX
[facility]                                   [address] [place] [address]                                                      PHONE: [phone]                                                         FAX: [phone]  [email or web address]  ---------------------------------------------------------------------------------------------------------------------------------    Overnight events/patient complaints:  NAD feeling well today     No Known Allergies    MEDICATIONS  (STANDING):  brimonidine 0.2% Ophthalmic Solution 1 Drop(s) Both EYES three times a day  dextrose 5%. 1000 milliLiter(s) (50 mL/Hr) IV Continuous <Continuous>  dextrose 50% Injectable 12.5 Gram(s) IV Push once  dextrose 50% Injectable 25 Gram(s) IV Push once  dextrose 50% Injectable 25 Gram(s) IV Push once  digoxin     Tablet 0.25 milliGRAM(s) Oral daily  dorzolamide 2% Ophthalmic Solution 1 Drop(s) Both EYES three times a day  dorzolamide 2% Ophthalmic Solution      lactobacillus acidophilus 1 Tablet(s) Oral daily  metoprolol succinate ER 25 milliGRAM(s) Oral daily  pantoprazole  Injectable 40 milliGRAM(s) IV Push daily  timolol 0.5% Solution 1 Drop(s) Both EYES two times a day    MEDICATIONS  (PRN):  dextrose Gel 1 Dose(s) Oral once PRN Blood Glucose LESS THAN 70 milliGRAM(s)/deciLiter  glucagon  Injectable 1 milliGRAM(s) IntraMuscular once PRN Glucose <70 milliGRAM(s)/deciLiter  lactulose Syrup 15 Gram(s) Oral once PRN if no bowel movement today with other constipation meds  polyethylene glycol 3350 17 Gram(s) Oral daily PRN Constipation  senna Syrup 10 milliLiter(s) Oral at bedtime PRN Constipation      Vital Signs Last 24 Hrs  T(C): 36.4 (25 Nov 2017 07:45), Max: 37.4 (24 Nov 2017 15:59)  T(F): 97.6 (25 Nov 2017 07:45), Max: 99.3 (24 Nov 2017 15:59)  HR: 90 (25 Nov 2017 11:48) (76 - 90)  BP: 93/64 (25 Nov 2017 11:48) (92/63 - 110/58)  BP(mean): --  RR: 18 (25 Nov 2017 11:48) (18 - 18)  SpO2: 95% (25 Nov 2017 11:48) (95% - 98%)  ICU Vital Signs Last 24 Hrs  NIKKI RODRIGUEZ  I&O's Detail    I&O's Summary    Drug Dosing Weight  NIKKI RODRIGUEZ      PHYSICAL EXAM:  General: Appears well developed, well nourished alert and cooperative.  HEENT: Head; normocephalic, atraumatic.  Eyes: Pupils reactive, cornea wnl.  Neck: Supple, no nodes adenopathy, no NVD or carotid bruit or thyromegaly.  CARDIOVASCULAR: Normal S1 and S2, 2/6 murmur, rub, gallop or lift.   LUNGS: No rales, rhonchi or wheeze. Normal breath sounds bilaterally.  ABDOMEN: Soft, nontender without mass or organomegaly. bowel sounds normoactive.  EXTREMITIES: No clubbing, cyanosis or edema. Distal pulses wnl.   SKIN: warm and dry with normal turgor.  NEURO: Alert/oriented      LABS:                        8.4    5.7   )-----------( 257      ( 25 Nov 2017 07:34 )             26.8     11-25    141  |  102  |  11.0  ----------------------------<  96  4.2   |  29.0  |  0.25<L>    Ca    9.7      25 Nov 2017 07:33    TPro  6.2<L>  /  Alb  3.4  /  TBili  0.4  /  DBili  x   /  AST  22  /  ALT  15  /  AlkPhos  95  11-24    NIKKI RODRIGUEZ            RADIOLOGY & ADDITIONAL STUDIES:    INTERPRETATION OF TELEMETRY (personally reviewed):    ECG:    ECHO:   Summary:   1. Left ventricular ejection fraction, by visual estimation, is >75%.   2. Hyperdynamic global left ventricular systolic function.   3. The mitral in-flow pattern reveals no discernable A-wave, therefore   no comment on diastolic function can be made.   4. Moderately dilated right atrium.   5. Mild mitral annular calcification.   6. Moderate tricuspid regurgitation.   7. Mild aortic regurgitation.   8. Estimated pulmonary artery systolic pressure is 49.5 mmHg assuming a   right atrial pressure of 3 mmHg, which is consistent with mild pulmonary   hypertension.   9. Moderately enlarged left atrium.        ASSESSMENT AND PLAN:  In summary, NIKKI RODRIGUEZ is an 77y Female with past medical history significant for ALS, paroxsymal atrial fibrillation on xarelto, Takotsubo cardiomyopathy (recovered EF), prior DVT, with recent PEG placement complicated by bleed and subsequent abdominal hematoma who presented to her outpatient cardiologist with tachycardia, lethargy and hypotension found to have hyperdynamic LVEF with THELMA, RV dilatation, suspected vegetation of AV, MV and TV as well as severe MR and TR concerning for endocarditis but blood culture negative and now HD stable; Repeat ECHO stable see above 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 The patient and family want to go home and do not want invasive therapy at this time; not AC candidate due to bleeding risk      Plan  1.  Change Toprol XL to 12.5 mg po daily and continue Dig therapy   2.  Increase Fluid intake     DC planning from CV standpoint

## 2017-11-25 NOTE — PROGRESS NOTE ADULT - PROVIDER SPECIALTY LIST ADULT
Cardiology
Hospitalist
Hospitalist
Infectious Disease
Infectious Disease
Internal Medicine
Surgery
Hospitalist

## 2017-11-26 LAB
CULTURE RESULTS: SIGNIFICANT CHANGE UP
CULTURE RESULTS: SIGNIFICANT CHANGE UP
SPECIMEN SOURCE: SIGNIFICANT CHANGE UP
SPECIMEN SOURCE: SIGNIFICANT CHANGE UP

## 2017-11-29 LAB
CULTURE RESULTS: SIGNIFICANT CHANGE UP
SPECIMEN SOURCE: SIGNIFICANT CHANGE UP

## 2018-08-08 NOTE — ED PROVIDER NOTE - PROGRESS NOTE DETAILS
Called pt with information below and pt requested we also send her written message back:     Good Morning Xiomara  In follow up to our telephone call, Dr. Chun has some further instruction/ advice for you.  1.  Be careful of overuse of allergy drops as there are preservatives in them  2.  If you do not have an allergy to fish oil try taking 2000mg capsules to increase oil in tears (they also make \"burp-less\")   3.  Keep doing hot soaks and gentle scrubs in the morning but also add this at night   4.  If you have continued symptoms down the road we may consider adding a prescription drop called Restasis--it does take time to kick in but makes you create more tears     Hope this information helps!  Henna/ Dr. Chun Office     Spoke with Dr. Mccann who agrees with course of action and wishes pt to follow up with Dr. Aponte this week.

## 2019-10-25 NOTE — ED PROVIDER NOTE - AGGRAVATING FACTORS
1. Cut Lisinopril in half (5mg) per day. Keep BP log at home 2. Take Prednisone 60mg (3 tablets) daily with breakfast for 5 days. Take first dose today after clinic 3. Start taking Finasteride 5mg daily to help with urinary problems 4. Take Dulera every day as prescribed 5. Take Singulair ever day 6. Use your Albuterol inhaler as needed every 4-6 hours Asthma Attack: Care Instructions Your Care Instructions During an asthma attack, the airways swell and narrow. This makes it hard to breathe. Severe asthma attacks can be life-threatening, but you can help prevent them by keeping your asthma under control and treating symptoms before they get bad. Symptoms include being short of breath, having chest tightness, coughing, and wheezing. Noting and treating these symptoms can also help you avoid future trips to the emergency room. The doctor has checked you carefully, but problems can develop later. If you notice any problems or new symptoms, get medical treatment right away. Follow-up care is a key part of your treatment and safety. Be sure to make and go to all appointments, and call your doctor if you are having problems. It's also a good idea to know your test results and keep a list of the medicines you take. How can you care for yourself at home? · Follow your asthma action plan to prevent and treat attacks. If you don't have an asthma action plan, work with your doctor to create one. · Take your asthma medicines exactly as prescribed. Talk to your doctor right away if you have any questions about how to take them. ? Use your quick-relief medicine when you have symptoms of an attack. Quick-relief medicine is usually an albuterol inhaler. Some people need to use quick-relief medicine before they exercise. ? Take your controller medicine every day, not just when you have symptoms. Controller medicine is usually an inhaled corticosteroid.  The goal is to prevent problems before they occur. Don't use your controller medicine to treat an attack that has already started. It doesn't work fast enough to help. ? If your doctor prescribed corticosteroid pills to use during an attack, take them exactly as prescribed. It may take hours for the pills to work, but they may make the episode shorter and help you breathe better. ? Keep your quick-relief medicine with you at all times. · Talk to your doctor before using other medicines. Some medicines, such as aspirin, can cause asthma attacks in some people. · If you have a peak flow meter, use it to check how well you are breathing. This can help you predict when an asthma attack is going to occur. Then you can take medicine to prevent the asthma attack or make it less severe. · Do not smoke or allow others to smoke around you. Avoid smoky places. Smoking makes asthma worse. If you need help quitting, talk to your doctor about stop-smoking programs and medicines. These can increase your chances of quitting for good. · Learn what triggers an asthma attack for you, and avoid the triggers when you can. Common triggers include colds, smoke, air pollution, dust, pollen, mold, pets, cockroaches, stress, and cold air. · Avoid colds and the flu. Get a pneumococcal vaccine shot. If you have had one before, ask your doctor if you need a second dose. Get a flu vaccine every fall. If you must be around people with colds or the flu, wash your hands often. When should you call for help? Call 911 anytime you think you may need emergency care. For example, call if: 
  · You have severe trouble breathing.  
 Call your doctor now or seek immediate medical care if: 
  · Your symptoms do not get better after you have followed your asthma action plan.  
  · You have new or worse trouble breathing.  
  · Your coughing and wheezing get worse.  
  · You cough up dark brown or bloody mucus (sputum).   · You have a new or higher fever.  
 Watch closely for changes in your health, and be sure to contact your doctor if: 
  · You need to use quick-relief medicine on more than 2 days a week (unless it is just for exercise).  
  · You cough more deeply or more often, especially if you notice more mucus or a change in the color of your mucus.  
  · You are not getting better as expected. Where can you learn more? Go to http://maria elena-karol.info/. Enter B610 in the search box to learn more about \"Asthma Attack: Care Instructions. \" Current as of: June 9, 2019 Content Version: 12.2 © 4270-2206 [employer]. Care instructions adapted under license by [employer] (which disclaims liability or warranty for this information). If you have questions about a medical condition or this instruction, always ask your healthcare professional. [address] any warranty or liability for your use of this information. none

## 2021-01-11 NOTE — ED PROVIDER NOTE - OBJECTIVE STATEMENT
THE COVID 19 TEST RESULTS  - results may take up to 2-3 days to become available   - if you have consented, you will receive your results electronically   -  you can check Dials GELY or call [phone] to discuss your results with our nursing staff    Please continue to self quarantine (home isolation) until your result is back and follow instructions accordingly  - positive: complete home isolation for a total of 14 days since day of testing and no more fever with feeling back to baseline   - negative: you will be able to stop home isolation but still practice standard precautions, similar to how you would manage a regular flu/cold.    Return to ER for any worsening symptoms, such as persistent fever >100.4F, shortness of breath, coughing up bloody sputum, chest pain, lethargy, and fainting    Please remember to wash your hands frequently (>20 seconds each time), avoid touching your face, and cover your cough/sneeze.  Always wear a mask when you are outside of your home and practice social distancing.    Only take Tylenol for fever/pain control and avoid NSAIDs (ibuprofen/Advil/Aleve/naproxen) due to potential increased risk of exacerbating COVID-19 infection A 76 year old female pt with a hx of ALS presents to the ED c/o LLE DVT. The pt was seen at her Pulmonologist Dr. Beltrán's office today for a regular check up and was noted to have swelling in her LLE. Pt was sent to [facility] and had a LLE Doppler done at to r/o DVT. The pt was told at the imaging center that the US was positive and to come to the ED. She denies any CP or SOB and has not had any pain in her calf. Pt is currently on Amiodarone and Metroprolol for A-fib and is wheel chair bound secondary to her ALS. She is not on any blood thinners. No further complaints at this time.

## 2021-03-02 NOTE — ED ADULT NURSE NOTE - NS ED NURSE DC INFO COMPLEXITY
none
Verbalized Understanding/Simple: Patient demonstrates quick and easy understanding/Patient asked questions

## 2022-07-13 NOTE — PATIENT PROFILE ADULT. - FALL HARM RISK CONCLUSION
Alternatives Discussed Intro (Do Not Add Period): I discussed alternative treatments to Mohs surgery and specifically discussed the risks and benefits of Fall with Harm Risk

## 2022-08-17 NOTE — H&P ADULT - GASTROINTESTINAL DETAILS
Infectious Disease Electrophysiology Surgery Intervent Radiology Cardiology mild discomfort in left lower abd. area on palpation. PEG tube is intact and no bleeding around  peg tube noted. mild distention in left lower abd, area./bowel sounds normal/soft/no rebound tenderness

## 2022-09-06 NOTE — ED ADULT NURSE NOTE - DISCHARGE TEACHING
Problem: Chronic Conditions and Co-morbidities  Goal: Patient's chronic conditions and co-morbidity symptoms are monitored and maintained or improved  Outcome: Progressing   CHF continue plan of care follow up PMD

## 2023-05-25 NOTE — ED ADULT NURSE NOTE - FALL HARM RISK CONCLUSION
Desiree Pedersen is a 70 year old female presenting for Dementia F/U. Patient's Son (Isael) needs documentation from MD stating he's POA.     Medications Reviewed.     Pharmacy Verified.    Denies known Latex allergy or symptoms of Latex sensitivity.    Health Maintenance Due   Topic Date Due   • COVID-19 Vaccine (3 - Booster for Moderna series) 05/14/2021   • Medicare Advantage- Medicare Wellness Visit  01/01/2023   • Hepatitis B Vaccine (For Physician/APC Discussion) (2 of 2 - CpG 2-dose series) 01/07/2023       Patient is due for topics as listed above but is not proceeding with Immunization(s) COVID-19 and Hep B at this time.          Patient would like communication of their results via:        Cell Phone:   Telephone Information:   Mobile [phone]     Okay to leave a message containing results? Yes     Universal Safety Interventions

## 2024-12-20 NOTE — ED ADULT NURSE REASSESSMENT NOTE - COMFORT CARE
No heavy lifting/straining/Follow Instructions Provided by your Surgical Team/Activity as tolerated
plan of care explained/repositioned/wait time explained/warm blanket provided/side rails up